# Patient Record
Sex: FEMALE | Race: WHITE | Employment: FULL TIME | URBAN - METROPOLITAN AREA
[De-identification: names, ages, dates, MRNs, and addresses within clinical notes are randomized per-mention and may not be internally consistent; named-entity substitution may affect disease eponyms.]

---

## 2017-07-11 ENCOUNTER — HOSPITAL ENCOUNTER (EMERGENCY)
Age: 32
Discharge: HOME | End: 2017-07-11
Attending: EMERGENCY MEDICINE | Admitting: EMERGENCY MEDICINE
Payer: COMMERCIAL

## 2017-07-11 ENCOUNTER — HOSPITAL ENCOUNTER (EMERGENCY)
Age: 32
Discharge: HOME | End: 2017-07-11
Attending: STUDENT IN AN ORGANIZED HEALTH CARE EDUCATION/TRAINING PROGRAM
Payer: COMMERCIAL

## 2017-07-11 VITALS
OXYGEN SATURATION: 99 % | TEMPERATURE: 99.2 F | HEART RATE: 82 BPM | RESPIRATION RATE: 14 BRPM | SYSTOLIC BLOOD PRESSURE: 116 MMHG | WEIGHT: 152 LBS | BODY MASS INDEX: 25.95 KG/M2 | DIASTOLIC BLOOD PRESSURE: 77 MMHG | HEIGHT: 64 IN

## 2017-07-11 VITALS
WEIGHT: 149.47 LBS | TEMPERATURE: 98.7 F | DIASTOLIC BLOOD PRESSURE: 77 MMHG | HEIGHT: 64 IN | OXYGEN SATURATION: 99 % | SYSTOLIC BLOOD PRESSURE: 117 MMHG | HEART RATE: 84 BPM | BODY MASS INDEX: 25.52 KG/M2 | RESPIRATION RATE: 18 BRPM

## 2017-07-11 DIAGNOSIS — F41.1 ANXIETY STATE: Primary | ICD-10-CM

## 2017-07-11 PROCEDURE — 99284 EMERGENCY DEPT VISIT MOD MDM: CPT

## 2017-07-11 PROCEDURE — 99283 EMERGENCY DEPT VISIT LOW MDM: CPT

## 2017-07-11 PROCEDURE — 90791 PSYCH DIAGNOSTIC EVALUATION: CPT

## 2017-07-11 PROCEDURE — 74011250637 HC RX REV CODE- 250/637: Performed by: EMERGENCY MEDICINE

## 2017-07-11 RX ORDER — LORAZEPAM 1 MG/1
1 TABLET ORAL
Status: COMPLETED | OUTPATIENT
Start: 2017-07-11 | End: 2017-07-11

## 2017-07-11 RX ORDER — SERTRALINE HYDROCHLORIDE 25 MG/1
25 TABLET, FILM COATED ORAL DAILY
Status: ON HOLD | COMMUNITY
End: 2017-07-12

## 2017-07-11 RX ORDER — HYDROXYZINE PAMOATE 25 MG/1
25 CAPSULE ORAL
Qty: 20 CAP | Refills: 0 | Status: ON HOLD | OUTPATIENT
Start: 2017-07-11 | End: 2017-07-12

## 2017-07-11 RX ADMIN — LORAZEPAM 1 MG: 1 TABLET ORAL at 10:15

## 2017-07-11 NOTE — ED TRIAGE NOTES
Triage note: Pt arrives ambulatory with c/o \"I feel like I'm having a nervous break down. I was seen at [facility] today and they gave me some hydroxyzine and it's not working. I called the hotline and they said to come in.\" When asked if she's having SI pt states \"I don't know, kind of.\" Denies plan.

## 2017-07-11 NOTE — ED PROVIDER NOTES
HPI Comments: Jackee Saint is a 36 y.o. female with PMhx significant for post-partum depression who presents ambulatory to the ED for evaluation of a mental health problem. Pt states that she has been experiencing increased anxiety since yesterday, explaining that she feels as if she's having a nervous breakdown as she can't control her emotions. She reports some SOB and decreased appetite secondary to her anxiety. She has a hx of post-partum depression and states that she previously was prescribed Zoloft following her first pregancy which she used for approximately 2 years prior to the birth of her second child 3 months ago; she denies any hx of depression or anxiety prior to the birth of her first child. She denies any complications with her two pregnancies. She reports that she has been under increased stress at home raising a 3year old infant and 4 month old, noting her  recently lost his job and she works full time from home while raising her kids full time; she receives some assistance from her  and mother-in-law but states she sometimes feels overwhelmed. She reports re-starting the Zoloft 25 mg today as she has leftover prescriptions, noting she was previously followed by Dr. Juan Sunshine (psychiatry). She specifically denies any thoughts of SI/HI, CP, N/V/D, or F/C. Social Hx: - Tobacco, + EtOH, - Illicit Drugs    There are no other complaints, changes or physical findings at this time. The history is provided by the patient. No  was used. Past Medical History:   Diagnosis Date    Depression     GERD (gastroesophageal reflux disease)     Peptic ulcer        No past surgical history on file. No family history on file. Social History     Social History    Marital status:      Spouse name: N/A    Number of children: N/A    Years of education: N/A     Occupational History    Not on file.      Social History Main Topics    Smoking status: Never Smoker    Smokeless tobacco: Not on file    Alcohol use Yes    Drug use: No    Sexual activity: Not on file     Other Topics Concern    Not on file     Social History Narrative    No narrative on file         ALLERGIES: Sulfa (sulfonamide antibiotics)    Review of Systems   Constitutional: Positive for appetite change (decreased). Negative for chills, diaphoresis, fever and unexpected weight change. HENT: Negative for rhinorrhea and sore throat. Eyes: Negative for pain. Respiratory: Positive for shortness of breath (secondary to anxiety). Negative for wheezing and stridor. Cardiovascular: Negative for chest pain and leg swelling. Gastrointestinal: Negative for abdominal pain, blood in stool, diarrhea, nausea and vomiting. Genitourinary: Negative for difficulty urinating, dysuria and flank pain. Musculoskeletal: Negative for back pain and neck stiffness. Skin: Negative for rash. Neurological: Negative for seizures, syncope, weakness, light-headedness and headaches. Psychiatric/Behavioral: Negative for confusion. The patient is nervous/anxious (anxiety). Denies SI/HI       Patient Vitals for the past 12 hrs:   Temp Pulse Resp BP SpO2   07/11/17 1015 - - - 117/77 99 %   07/11/17 1000 - - - 113/76 100 %   07/11/17 0945 - - - 110/67 99 %   07/11/17 0940 - - - 116/74 -   07/11/17 0919 98.7 °F (37.1 °C) 84 18 (!) 122/103 100 %            Physical Exam   Constitutional: She is oriented to person, place, and time. She appears well-developed and well-nourished. No distress. HENT:   Nose: Nose normal.   Mouth/Throat: Oropharynx is clear and moist. No oropharyngeal exudate. Eyes: Conjunctivae and EOM are normal. Pupils are equal, round, and reactive to light. Right eye exhibits no discharge. Left eye exhibits no discharge. No scleral icterus. Neck: Normal range of motion. Neck supple. No JVD present.    Cardiovascular: Normal rate, regular rhythm, normal heart sounds and intact distal pulses. No murmur heard. Pulmonary/Chest: Effort normal and breath sounds normal. No stridor. No respiratory distress. She has no wheezes. She has no rales. Abdominal: Soft. Bowel sounds are normal. She exhibits no distension. There is no tenderness. There is no rebound and no guarding. Musculoskeletal: She exhibits no edema or tenderness. Neurological: She is alert and oriented to person, place, and time. Skin: Skin is warm and dry. No rash noted. She is not diaphoretic. Psychiatric: Her mood appears anxious. Tearful on exam.    Nursing note and vitals reviewed. MDM  Number of Diagnoses or Management Options  Post partum depression:   Diagnosis management comments: Post-partum depression with additional symptoms of anxiety and multiple social stressors. Pt without SI. Counseled pt at length (>20 minutes spent discussing self-care, stress relief, medications to be used prn, need for counseling, etc.) and have advised her to follow up closely with mental health. Additionally, she will restart her Zoloft. Stable for discharge. Amount and/or Complexity of Data Reviewed  Review and summarize past medical records: yes    Patient Progress  Patient progress: stable        Procedures    MEDICATIONS GIVEN:  Medications   LORazepam (ATIVAN) tablet 1 mg (1 mg Oral Given 7/11/17 1015)       IMPRESSION:  1. Post partum depression        PLAN:  1. Discharge Medication List as of 7/11/2017 10:14 AM      CONTINUE these medications which have NOT CHANGED    Details   sertraline (ZOLOFT) 25 mg tablet Take 25 mg by mouth daily. , Historical Med           2. Follow-up Information     Follow up With Details Comments [facility] Provider Call in 1 day          Return to ED if worse     Discharge Note:  10:20 AM  The patient has been re-evaluated and is ready for discharge. Reviewed available results with patient. Counseled patient on diagnosis and care plan.  Patient has expressed understanding, and all questions have been answered. Patient agrees with plan and agrees to follow up as recommended, or return to the ED if their symptoms worsen. Discharge instructions have been provided and explained to the patient, along with reasons to return to the ED. Attestation: This note is prepared by Rubin Randolph, acting as Scribe for Josh Sam MD.    Josh Sam MD: The scribe's documentation has been prepared under my direction and personally reviewed by me in its entirety. I confirm that the note above accurately reflects all work, treatment, procedures, and medical decision making performed by me.

## 2017-07-11 NOTE — BSMART NOTE
Was asked to see this pt  Today due to anxiety. Pt. States she went to [id number] [facility] this am and was given a 1 mg. Ativan and started to feel better but didn't feel like she got much help. Pt.  started calling around to see if she could get in to see a psychiatrist but realized it would take a long time so she came here to see what we could offer. Pt was not offered BSMART at [address] [facility] and reported that she was diagnosed with Post Partum Depression. Pt states she works from home and has 2 young children so she never gets out of her home. Pt. Has a 132 month old and a 3year old.  has just been layed off from her job so finances are a big concern. Pt. Is not reporting any acute symptoms but is tearful and reports feeling overwhelmed with her responsibilities and feeling tired from lack of sleep from her baby. Discussed pt getting a counselor (and referred her to [facility] counseling since she lives in [place]) as well as getting a scheduled appointment to see a psychiatrist (which she used to see one at [facility] but got pregnant so stopped going) for medication to address her anxiety and perhaps some depression. Pt denies suicidal or homicidal ideations. Pt is alert and oriented X3. Pt is not psychotic or delusional. Discussed with the PA perhaps writing pt out of work for a few days to [employer] as well as perhaps a medication for something for her anxiety until she sees someone at [facility] (since pt doesn't have a PCP at this time). Pt will be discharged once medically cleared.   Mehreen calero, BHARTI

## 2017-07-11 NOTE — ED PROVIDER NOTES
HPI Comments: 36 y.o. female with past medical history significant for depression, GERD and peptic ulcer who presents ambulatory from home accompanied by her  with chief complaint of anxiety. Pt states worsening anxiety onset 3 months ago after having a baby. Pt states she is \"unable to control her emotions\". Pt admits to multiple home stressors, specifically her 's unemployment and her . Pt states she has previously taken Zoloft, but was weaned off of her medications during her pregnancy. She took Zoloft today as well as hydroxyzine that she was prescribed at [facility] this morning. Pt states neither medication offered relief. Pt specifically denies chest pain and abdominal pain. There are no other acute medical concerns at this time. Social hx: denies tobacco use; uses EtOH rarely; denies illicit drug use  PCP: None    Note written by Matthieu Holman, as dictated by Paula Johnston PA-C 5:23 PM      The history is provided by the patient. Past Medical History:   Diagnosis Date    Depression     GERD (gastroesophageal reflux disease)     Peptic ulcer        Past Surgical History:   Procedure Laterality Date    HX  SECTION  ,          No family history on file. Social History     Social History    Marital status:      Spouse name: N/A    Number of children: N/A    Years of education: N/A     Occupational History    Not on file. Social History Main Topics    Smoking status: Never Smoker    Smokeless tobacco: Not on file    Alcohol use Yes      Comment: rarely    Drug use: No    Sexual activity: Not on file     Other Topics Concern    Not on file     Social History Narrative         ALLERGIES: Sulfa (sulfonamide antibiotics)    Review of Systems   Constitutional: Negative for chills, diaphoresis and fever. HENT: Negative for congestion, postnasal drip, rhinorrhea and sore throat.     Eyes: Negative for photophobia, discharge, redness and visual disturbance. Respiratory: Negative for cough, chest tightness, shortness of breath and wheezing. Cardiovascular: Negative for chest pain, palpitations and leg swelling. Gastrointestinal: Negative for abdominal distention, abdominal pain, blood in stool, constipation, diarrhea, nausea and vomiting. Genitourinary: Negative for difficulty urinating, dysuria, frequency, hematuria and urgency. Musculoskeletal: Negative for arthralgias, back pain, joint swelling and myalgias. Skin: Negative for color change and rash. Neurological: Negative for dizziness, speech difficulty, weakness, light-headedness, numbness and headaches. Psychiatric/Behavioral: Negative for confusion. The patient is nervous/anxious. All other systems reviewed and are negative. Vitals:    07/11/17 1504   BP: 108/76   Pulse: 76   Resp: 18   Temp: 98.4 °F (36.9 °C)   SpO2: 97%   Weight: 68.9 kg (152 lb)   Height: 5' 4\" (1.626 m)            Physical Exam   Constitutional: She is oriented to person, place, and time. She appears well-developed and well-nourished. HENT:   Head: Normocephalic and atraumatic. Eyes: Conjunctivae are normal. Pupils are equal, round, and reactive to light. Right eye exhibits no discharge. Left eye exhibits no discharge. Neck: Normal range of motion. Neck supple. No thyromegaly present. Cardiovascular: Normal rate, regular rhythm and normal heart sounds. Exam reveals no gallop and no friction rub. No murmur heard. Pulmonary/Chest: Effort normal and breath sounds normal. No respiratory distress. She has no wheezes. Abdominal: Soft. Bowel sounds are normal. She exhibits no distension. There is no tenderness. There is no rebound and no guarding. Musculoskeletal: Normal range of motion. Neurological: She is alert and oriented to person, place, and time. Skin: Skin is warm. Psychiatric: She has a normal mood and affect.         MDM  Number of Diagnoses or Management Options  Anxiety state:   Diagnosis management comments: Pt presents with anxiety. B-SMART came and evaluated and determined that she does not meet criteria for admission. Will d/c home with resources provided by [facility]. Reviewed treatment plan with attending and they agree. Leticia Padilla      ED Course       Procedures    4:41 PM  BSMART at bedside evaluating patient.

## 2017-07-11 NOTE — ED TRIAGE NOTES
Pt received to exam room for c/o feeling like she is \"on the verge of a nervous breakdown. Pt had baby 3 mos ago and has been feeling like this since but feels like things are getting worse. Pt was taking Zoloft before pregnancy but stopped taking during pregnancy. States took a Zoloft this AM along w/ Xanax.  is at bedside.

## 2017-07-11 NOTE — DISCHARGE INSTRUCTIONS
Anxiety Disorder: Care Instructions  Your Care Instructions  Anxiety is a normal reaction to stress. Difficult situations can cause you to have symptoms such as sweaty palms and a nervous feeling. In an anxiety disorder, the symptoms are far more severe. Constant worry, muscle tension, trouble sleeping, nausea and diarrhea, and other symptoms can make normal daily activities difficult or impossible. These symptoms may occur for no reason, and they can affect your work, school, or social life. Medicines, counseling, and self-care can all help. Follow-up care is a key part of your treatment and safety. Be sure to make and go to all appointments, and call your doctor if you are having problems. It's also a good idea to know your test results and keep a list of the medicines you take. How can you care for yourself at home? · Take medicines exactly as directed. Call your doctor if you think you are having a problem with your medicine. · Go to your counseling sessions and follow-up appointments. · Recognize and accept your anxiety. Then, when you are in a situation that makes you anxious, say to yourself, \"This is not an emergency. I feel uncomfortable, but I am not in danger. I can keep going even if I feel anxious. \"  · Be kind to your body:  ¨ Relieve tension with exercise or a massage. ¨ Get enough rest.  ¨ Avoid alcohol, caffeine, nicotine, and illegal drugs. They can increase your anxiety level and cause sleep problems. ¨ Learn and do relaxation techniques. See below for more about these techniques. · Engage your mind. Get out and do something you enjoy. Go to a funny movie, or take a walk or hike. Plan your day. Having too much or too little to do can make you anxious. · Keep a record of your symptoms. Discuss your fears with a good friend or family member, or join a support group for people with similar problems. Talking to others sometimes relieves stress.   · Get involved in social groups, or volunteer to help others. Being alone sometimes makes things seem worse than they are. · Get at least 30 minutes of exercise on most days of the week to relieve stress. Walking is a good choice. You also may want to do other activities, such as running, swimming, cycling, or playing tennis or team sports. Relaxation techniques  Do relaxation exercises 10 to 20 minutes a day. You can play soothing, relaxing music while you do them, if you wish. · Tell others in your house that you are going to do your relaxation exercises. Ask them not to disturb you. · Find a comfortable place, away from all distractions and noise. · Lie down on your back, or sit with your back straight. · Focus on your breathing. Make it slow and steady. · Breathe in through your nose. Breathe out through either your nose or mouth. · Breathe deeply, filling up the area between your navel and your rib cage. Breathe so that your belly goes up and down. · Do not hold your breath. · Breathe like this for 5 to 10 minutes. Notice the feeling of calmness throughout your whole body. As you continue to breathe slowly and deeply, relax by doing the following for another 5 to 10 minutes:  · Tighten and relax each muscle group in your body. You can begin at your toes and work your way up to your head. · Imagine your muscle groups relaxing and becoming heavy. · Empty your mind of all thoughts. · Let yourself relax more and more deeply. · Become aware of the state of calmness that surrounds you. · When your relaxation time is over, you can bring yourself back to alertness by moving your fingers and toes and then your hands and feet and then stretching and moving your entire body. Sometimes people fall asleep during relaxation, but they usually wake up shortly afterward. · Always give yourself time to return to full alertness before you drive a car or do anything that might cause an accident if you are not fully alert.  Never play a relaxation tape while you drive a car. When should you call for help? Call 911 anytime you think you may need emergency care. For example, call if:  · You feel you cannot stop from hurting yourself or someone else. Keep the numbers for these national suicide hotlines: 1-979-741-TALK (2-794.552.5695) and 0-980-PULGSFF (3-775.642.4750). If you or someone you know talks about suicide or feeling hopeless, get help right away. Watch closely for changes in your health, and be sure to contact your doctor if:  · You have anxiety or fear that affects your life. · You have symptoms of anxiety that are new or different from those you had before. Where can you learn more? Go to http://gretchenTributes.comamor.info/. Enter P754 in the search box to learn more about \"Anxiety Disorder: Care Instructions. \"  Current as of: July 26, 2016  Content Version: 11.3  © 5946-3678 [employer]. Care instructions adapted under license by [employer] (which disclaims liability or warranty for this information). If you have questions about a medical condition or this instruction, always ask your healthcare professional. [address] any warranty or liability for your use of this information. We hope that we have addressed all of your medical concerns. The examination and treatment you received in the Emergency Department were for an emergent problem and were not intended as complete care. It is important that you follow up with your healthcare provider(s) for ongoing care. If your symptoms worsen or do not improve as expected, and you are unable to reach your usual health care provider(s), you should return to the Emergency Department. Today's healthcare is undergoing tremendous change, and patient satisfaction surveys are one of the many tools to assess the quality of medical care.   You may receive a survey from the [employer] regarding your experience in the Emergency Department. I hope that your experience has been completely positive, particularly the medical care that I provided. As such, please participate in the survey; anything less than excellent does not meet my expectations or intentions. [facility] and [facility] participate in nationally recognized quality of care measures. If your blood pressure is greater than 120/80, as reported below, we urge that you seek medical care to address the potential of high blood pressure, commonly known as hypertension. Hypertension can be hereditary or can be caused by certain medical conditions, pain, stress, or \"white coat syndrome. \"       Please make an appointment with your health care provider(s) for follow up of your Emergency Department visit. VITALS:   Patient Vitals for the past 8 hrs:   Temp Pulse Resp BP SpO2   07/11/17 1504 98.4 °F (36.9 °C) 76 18 108/76 97 %          Thank you for allowing us to provide you with medical care today. We realize that you have many choices for your emergency care needs. Please choose us in the future for any continued health care needs. Angélica Doan milad Caballero, [address]: [phone]            No results found for this or any previous visit (from the past 24 hour(s)). No results found.

## 2017-07-11 NOTE — LETTER
[address] 
[address] [facility] [id number] 
[phone] Work/School Note Date: 7/11/2017 To Whom It May concern: 
 
Azul Thomas was seen and treated today in the emergency room by the following provider(s): 
Attending Provider: Miguel Cole MD 
Physician Assistant: Rudy Nye PA-C. Azul Thomas may return to work on 7/13/17.  
 
Sincerely, 
 
 
 
 
Rudy Nye PA-C

## 2017-07-11 NOTE — DISCHARGE INSTRUCTIONS
Depression After Childbirth: Care Instructions  Your Care Instructions  Many women get the \"baby blues\" during the first few days after childbirth. You may lose sleep, feel irritable, and cry easily. You may feel happy one minute and sad the next. Hormone changes are one cause of these emotional changes. Also, the demands of a new baby, along with visits from relatives or other family needs, add to a mother's stress. The \"baby blues\" often peak around the fourth day. Then they ease up in less than 2 weeks. If your moodiness or anxiety lasts for more than 2 weeks, or if you feel like life is not worth living, you may have postpartum depression. This is different for each mother. Some mothers with serious depression may worry intensely about their infant's well-being. Others may feel distant from their child. Some mothers might even feel that they might harm their baby. A mother may have signs of paranoia, wondering if someone is watching her. Depression is not a sign of weakness. It is a medical condition that requires treatment. Medicine and counseling often work well to reduce depression. Talk to your doctor about taking antidepressant medicine while breastfeeding. Follow-up care is a key part of your treatment and safety. Be sure to make and go to all appointments, and call your doctor if you are having problems. It's also a good idea to know your test results and keep a list of the medicines you take. How do you know if you are depressed? With all the changes in your life, you may not know if you are depressed. Pregnancy sometimes causes changes in how you feel that are similar to the symptoms of depression. Symptoms of depression include:  · Feeling sad or hopeless and losing interest in daily activities. These are the most common symptoms of depression. · Sleeping too much or not enough. · Feeling tired. You may feel as if you have no energy. · Eating too much or too little.   · Writing or talking about death, such as writing suicide notes or talking about guns, knives, or pills. Keep the numbers for these national suicide hotlines: 3-851-459-TALK (9-963.839.3681) and 0-157-MOEGAWJ (0-259.914.9543). If you or someone you know talks about suicide or feeling hopeless, get help right away. How can you care for yourself at home? · Be safe with medicines. Take your medicines exactly as prescribed. Call your doctor if you think you are having a problem with your medicine. · Eat a healthy diet so that you can keep up your energy. · Get regular daily exercise, such as walks, to help improve your mood. · Get as much sunlight as possible. Keep your shades and curtains open. Get outside as much as you can. · Avoid using alcohol or other substances to feel better. · Get as much rest and sleep as possible. Avoid doing too much. Being too tired can increase depression. · Play stimulating music throughout your day and soothing music at night. · Schedule outings and visits with friends and family. Ask them to call you regularly, so that you do not feel alone. · Ask for help with preparing food and other daily tasks. Family and friends are often happy to help a mother with a . · Be honest with yourself and those who care about you. Tell them about your struggle. · Join a support group of new mothers. No one can better understand the challenges of caring for a  than other new mothers. · If you feel like life is not worth living or are feeling hopeless, get help right away. Keep the numbers for these national suicide hotlines: -TALK (8-460.514.7063) and 1-082-QMVQFEH (0-841.817.6531). When should you call for help? Call 911 anytime you think you may need emergency care. For example, call if:  · You feel you cannot stop from hurting yourself, your baby, or someone else. Call your doctor now or seek immediate medical care if:  · You are having trouble caring for yourself or your baby.   · You hear voices. Watch closely for changes in your health, and be sure to contact your doctor if:  · You have problems with your depression medicine. · You do not get better as expected. Where can you learn more? Go to http://gretchen-amor.info/. Enter Z727 in the search box to learn more about \"Depression After Childbirth: Care Instructions. \"  Current as of: July 26, 2016  Content Version: 11.3  © 0241-5091 [employer]. Care instructions adapted under license by [employer] (which disclaims liability or warranty for this information). If you have questions about a medical condition or this instruction, always ask your healthcare professional. [address] any warranty or liability for your use of this information.

## 2017-07-11 NOTE — ED NOTES
Discharge instructions reviewed w/ pt and copy given by Dr. Chhaya Salinas. Pt discharged ambulatory to care of .

## 2017-07-12 ENCOUNTER — HOSPITAL ENCOUNTER (INPATIENT)
Age: 32
LOS: 2 days | Discharge: HOME | DRG: 884 | End: 2017-07-14
Attending: PSYCHIATRY & NEUROLOGY | Admitting: PSYCHIATRY & NEUROLOGY
Payer: COMMERCIAL

## 2017-07-12 LAB
ALBUMIN SERPL BCP-MCNC: 4.4 G/DL (ref 3.5–5)
ALBUMIN/GLOB SERPL: 1.2 {RATIO} (ref 1.1–2.2)
ALP SERPL-CCNC: 70 U/L (ref 45–117)
ALT SERPL-CCNC: 25 U/L (ref 12–78)
AMPHET UR QL SCN: NEGATIVE
ANION GAP BLD CALC-SCNC: 8 MMOL/L (ref 5–15)
APPEARANCE UR: ABNORMAL
AST SERPL W P-5'-P-CCNC: 15 U/L (ref 15–37)
BACTERIA URNS QL MICRO: NEGATIVE /HPF
BARBITURATES UR QL SCN: NEGATIVE
BASOPHILS # BLD AUTO: 0 K/UL (ref 0–0.1)
BASOPHILS # BLD: 0 % (ref 0–1)
BENZODIAZ UR QL: POSITIVE
BILIRUB SERPL-MCNC: 2.9 MG/DL (ref 0.2–1)
BILIRUB UR QL CFM: NEGATIVE
BUN SERPL-MCNC: 20 MG/DL (ref 6–20)
BUN/CREAT SERPL: 22 (ref 12–20)
CALCIUM SERPL-MCNC: 8.9 MG/DL (ref 8.5–10.1)
CANNABINOIDS UR QL SCN: NEGATIVE
CHLORIDE SERPL-SCNC: 105 MMOL/L (ref 97–108)
CO2 SERPL-SCNC: 25 MMOL/L (ref 21–32)
COCAINE UR QL SCN: NEGATIVE
COLOR UR: ABNORMAL
CREAT SERPL-MCNC: 0.92 MG/DL (ref 0.55–1.02)
DRUG SCRN COMMENT,DRGCM: ABNORMAL
EOSINOPHIL # BLD: 0.1 K/UL (ref 0–0.4)
EOSINOPHIL NFR BLD: 1 % (ref 0–7)
EPITH CASTS URNS QL MICRO: ABNORMAL /LPF
ERYTHROCYTE [DISTWIDTH] IN BLOOD BY AUTOMATED COUNT: 14.4 % (ref 11.5–14.5)
ETHANOL SERPL-MCNC: <10 MG/DL
GLOBULIN SER CALC-MCNC: 3.6 G/DL (ref 2–4)
GLUCOSE SERPL-MCNC: 97 MG/DL (ref 65–100)
GLUCOSE UR STRIP.AUTO-MCNC: NEGATIVE MG/DL
HCG UR QL: NEGATIVE
HCT VFR BLD AUTO: 38.5 % (ref 35–47)
HGB BLD-MCNC: 13.1 G/DL (ref 11.5–16)
HGB UR QL STRIP: ABNORMAL
KETONES UR QL STRIP.AUTO: ABNORMAL MG/DL
LEUKOCYTE ESTERASE UR QL STRIP.AUTO: NEGATIVE
LYMPHOCYTES # BLD AUTO: 27 % (ref 12–49)
LYMPHOCYTES # BLD: 1.4 K/UL (ref 0.8–3.5)
MCH RBC QN AUTO: 27.4 PG (ref 26–34)
MCHC RBC AUTO-ENTMCNC: 34 G/DL (ref 30–36.5)
MCV RBC AUTO: 80.5 FL (ref 80–99)
METHADONE UR QL: NEGATIVE
MONOCYTES # BLD: 0.4 K/UL (ref 0–1)
MONOCYTES NFR BLD AUTO: 8 % (ref 5–13)
MUCOUS THREADS URNS QL MICRO: ABNORMAL /LPF
NEUTS SEG # BLD: 3.2 K/UL (ref 1.8–8)
NEUTS SEG NFR BLD AUTO: 64 % (ref 32–75)
NITRITE UR QL STRIP.AUTO: NEGATIVE
OPIATES UR QL: NEGATIVE
PCP UR QL: NEGATIVE
PH UR STRIP: 6 [PH] (ref 5–8)
PLATELET # BLD AUTO: 206 K/UL (ref 150–400)
POTASSIUM SERPL-SCNC: 3.8 MMOL/L (ref 3.5–5.1)
PROT SERPL-MCNC: 8 G/DL (ref 6.4–8.2)
PROT UR STRIP-MCNC: ABNORMAL MG/DL
RBC # BLD AUTO: 4.78 M/UL (ref 3.8–5.2)
RBC #/AREA URNS HPF: ABNORMAL /HPF (ref 0–5)
SODIUM SERPL-SCNC: 138 MMOL/L (ref 136–145)
SP GR UR REFRACTOMETRY: 1.03 (ref 1–1.03)
UROBILINOGEN UR QL STRIP.AUTO: 1 EU/DL (ref 0.2–1)
WBC # BLD AUTO: 5.1 K/UL (ref 3.6–11)
WBC URNS QL MICRO: ABNORMAL /HPF (ref 0–4)

## 2017-07-12 PROCEDURE — 90791 PSYCH DIAGNOSTIC EVALUATION: CPT

## 2017-07-12 PROCEDURE — 80307 DRUG TEST PRSMV CHEM ANLYZR: CPT | Performed by: STUDENT IN AN ORGANIZED HEALTH CARE EDUCATION/TRAINING PROGRAM

## 2017-07-12 PROCEDURE — 36415 COLL VENOUS BLD VENIPUNCTURE: CPT | Performed by: PSYCHIATRY & NEUROLOGY

## 2017-07-12 PROCEDURE — 99284 EMERGENCY DEPT VISIT MOD MDM: CPT

## 2017-07-12 PROCEDURE — 74011250637 HC RX REV CODE- 250/637: Performed by: PSYCHIATRY & NEUROLOGY

## 2017-07-12 PROCEDURE — 65220000003 HC RM SEMIPRIVATE PSYCH

## 2017-07-12 PROCEDURE — 81025 URINE PREGNANCY TEST: CPT

## 2017-07-12 PROCEDURE — 81001 URINALYSIS AUTO W/SCOPE: CPT | Performed by: STUDENT IN AN ORGANIZED HEALTH CARE EDUCATION/TRAINING PROGRAM

## 2017-07-12 PROCEDURE — 80053 COMPREHEN METABOLIC PANEL: CPT | Performed by: STUDENT IN AN ORGANIZED HEALTH CARE EDUCATION/TRAINING PROGRAM

## 2017-07-12 PROCEDURE — 36415 COLL VENOUS BLD VENIPUNCTURE: CPT | Performed by: STUDENT IN AN ORGANIZED HEALTH CARE EDUCATION/TRAINING PROGRAM

## 2017-07-12 PROCEDURE — 84443 ASSAY THYROID STIM HORMONE: CPT | Performed by: PSYCHIATRY & NEUROLOGY

## 2017-07-12 PROCEDURE — 85025 COMPLETE CBC W/AUTO DIFF WBC: CPT | Performed by: STUDENT IN AN ORGANIZED HEALTH CARE EDUCATION/TRAINING PROGRAM

## 2017-07-12 RX ORDER — ACETAMINOPHEN 325 MG/1
650 TABLET ORAL
Status: DISCONTINUED | OUTPATIENT
Start: 2017-07-12 | End: 2017-07-14 | Stop reason: HOSPADM

## 2017-07-12 RX ORDER — LORAZEPAM 2 MG/ML
2 INJECTION INTRAMUSCULAR
Status: DISCONTINUED | OUTPATIENT
Start: 2017-07-12 | End: 2017-07-14 | Stop reason: HOSPADM

## 2017-07-12 RX ORDER — SERTRALINE HYDROCHLORIDE 50 MG/1
50 TABLET, FILM COATED ORAL DAILY
Status: DISCONTINUED | OUTPATIENT
Start: 2017-07-13 | End: 2017-07-13

## 2017-07-12 RX ORDER — ZOLPIDEM TARTRATE 10 MG/1
10 TABLET ORAL
Status: DISCONTINUED | OUTPATIENT
Start: 2017-07-12 | End: 2017-07-12 | Stop reason: DRUGHIGH

## 2017-07-12 RX ORDER — LORAZEPAM 1 MG/1
1 TABLET ORAL
Status: DISCONTINUED | OUTPATIENT
Start: 2017-07-12 | End: 2017-07-14 | Stop reason: HOSPADM

## 2017-07-12 RX ORDER — IBUPROFEN 200 MG
1 TABLET ORAL
Status: DISCONTINUED | OUTPATIENT
Start: 2017-07-12 | End: 2017-07-14 | Stop reason: HOSPADM

## 2017-07-12 RX ORDER — ADHESIVE BANDAGE
30 BANDAGE TOPICAL DAILY PRN
Status: DISCONTINUED | OUTPATIENT
Start: 2017-07-12 | End: 2017-07-14 | Stop reason: HOSPADM

## 2017-07-12 RX ORDER — BENZTROPINE MESYLATE 2 MG/1
2 TABLET ORAL
Status: DISCONTINUED | OUTPATIENT
Start: 2017-07-12 | End: 2017-07-14 | Stop reason: HOSPADM

## 2017-07-12 RX ORDER — SERTRALINE HYDROCHLORIDE 50 MG/1
50 TABLET, FILM COATED ORAL DAILY
COMMUNITY
End: 2017-07-14

## 2017-07-12 RX ORDER — ZOLPIDEM TARTRATE 5 MG/1
5 TABLET ORAL
Status: DISCONTINUED | OUTPATIENT
Start: 2017-07-12 | End: 2017-07-14 | Stop reason: HOSPADM

## 2017-07-12 RX ORDER — BENZTROPINE MESYLATE 1 MG/ML
2 INJECTION INTRAMUSCULAR; INTRAVENOUS
Status: DISCONTINUED | OUTPATIENT
Start: 2017-07-12 | End: 2017-07-14 | Stop reason: HOSPADM

## 2017-07-12 RX ORDER — IBUPROFEN 400 MG/1
400 TABLET ORAL
Status: DISCONTINUED | OUTPATIENT
Start: 2017-07-12 | End: 2017-07-14 | Stop reason: HOSPADM

## 2017-07-12 RX ORDER — OLANZAPINE 5 MG/1
5 TABLET ORAL
Status: DISCONTINUED | OUTPATIENT
Start: 2017-07-12 | End: 2017-07-14 | Stop reason: HOSPADM

## 2017-07-12 RX ADMIN — ZOLPIDEM TARTRATE 5 MG: 5 TABLET ORAL at 23:16

## 2017-07-12 RX ADMIN — LORAZEPAM 1 MG: 1 TABLET ORAL at 21:44

## 2017-07-12 NOTE — ED TRIAGE NOTES
Pt arrives with  after OBGYN visit for SI/HI. Pt states that she has been having thoughts of harming herself and her 4 month old daughter. Pt crying in triage. Daughter currently staying with mother-in-law.

## 2017-07-12 NOTE — IP AVS SNAPSHOT
[address] 
[phone] Patient: Lennie Wilder MRN: [id number] DDJ:[id number]/[id number] Current Discharge Medication List  
  
START taking these medications Dose & Instructions Dispensing Information Comments Morning Noon Evening Bedtime FLUoxetine 10 mg capsule Commonly known as:  PROzac Your last dose was: Your next dose is:    
   
   
 Dose:  10 mg Take 1 Cap by mouth daily. Indications: GENERALIZED ANXIETY DISORDER, major depressive disorder Quantity:  15 Cap Refills:  1  
     
   
   
   
  
 traZODone 50 mg tablet Commonly known as:  Carli Hand Your last dose was: Your next dose is:    
   
   
 Dose:  50 mg Take 1 Tab by mouth nightly. Indications: insomnia associated with depression Quantity:  15 Tab Refills:  1 STOP taking these medications ZOLOFT 50 mg tablet Generic drug:  sertraline Where to Get Your Medications Information on where to get these meds will be given to you by the nurse or doctor. ! Ask your nurse or doctor about these medications FLUoxetine 10 mg capsule  
 traZODone 50 mg tablet

## 2017-07-12 NOTE — IP AVS SNAPSHOT
[address] [facility] [address] [place] [address] 
[phone] Patient: Sandra Pepe MRN: [id number] MMU:1/40/7852 You are allergic to the following Allergen Reactions Sulfa (Sulfonamide Antibiotics) Unknown (comments) Recent Documentation Height Weight BMI OB Status Smoking Status 1.626 m 68.9 kg 26.09 kg/m2 IUD Never Smoker Emergency Contacts Name Discharge Info Relation Home Work Mobile [address] CAREGIVER [3] Spouse [3]   [phone] About your hospitalization You were admitted on:  July 12, 2017 You last received care in the:  [address] [place] [address] You were discharged on:  July 14, 2017 Unit phone number:  [phone] Why you were hospitalized Your primary diagnosis was:  Depression, Postpartum Providers Seen During Your Hospitalizations Provider Role Specialty Primary office phone Marissa Light MD Attending Provider Psychiatry [phone] Ifeoma Carson MD Attending Provider Emergency Medicine [phone] Blake Bob MD Attending Provider Psychiatry [phone] Your Primary Care Physician (PCP) Primary Care Physician Office Phone Office Fax NONE ** None ** ** None ** Follow-up Information Follow up With Details Comments Contact Info None   None ([phone]) Patient stated that they have no PCP Theresa Galindo LCSW On 7/19/2017 You have a 3:15pm appointment for therapy. Please remember to bring your photo ID and insurance card. [address] [facility] [address] 
[phone] Jose Willams MD Schedule an appointment as soon as possible for a visit  [address] [facility] [address] [employer] [facility] [address] [place] [address] 
[phone] Current Discharge Medication List  
  
START taking these medications Dose & Instructions Dispensing Information Comments Morning Noon Evening Bedtime FLUoxetine 10 mg capsule Commonly known as:  PROzac Your last dose was: Your next dose is:    
   
   
 Dose:  10 mg Take 1 Cap by mouth daily. Indications: GENERALIZED ANXIETY DISORDER, major depressive disorder Quantity:  15 Cap Refills:  1  
     
   
   
   
  
 traZODone 50 mg tablet Commonly known as:  Illene Dus Your last dose was: Your next dose is:    
   
   
 Dose:  50 mg Take 1 Tab by mouth nightly. Indications: insomnia associated with depression Quantity:  15 Tab Refills:  1 STOP taking these medications ZOLOFT 50 mg tablet Generic drug:  sertraline Where to Get Your Medications Information on where to get these meds will be given to you by the nurse or doctor. ! Ask your nurse or doctor about these medications FLUoxetine 10 mg capsule  
 traZODone 50 mg tablet Discharge Instructions DISCHARGE SUMMARY 
 
Sukumar Borjas : 1985 MRN: [id number] The patient Samia Mcarthur exhibits the ability to control behavior in a less restrictive environment. Patient's level of functioning is improving. No assaultive/destructive behavior has been observed for the past 24 hours. No suicidal/homicidal threat or behavior has been observed for the past 24 hours. There is no evidence of serious medication side effects. Patient has not been in physical or protective restraints for at least the past 24 hours. If weapons involved, how are they secured? No weapons involved. Is patient aware of and in agreement with discharge plan? Yes Arrangements for medication:  Prescriptions given to patient. Copy of discharge instructions to  provider?:  Karma Doll ([phone]) and Dr. Ted Lozoya ([phone]) Arrangements for transportation home:   to  Keep all follow up appointments as scheduled, continue to take prescribed medications per physician instructions. Mental health crisis number:  271 or your local mental health crisis line number at [phone] DISCHARGE SUMMARY from Nurse The following personal items are in your possession at time of discharge: 
 
Dental Appliances: None Visual Aid: None Home Medications: None Jewelry: None Clothing: Pants, Shirt, Undergarments, With patient (2pants,2shirts,2undies) Other Valuables: None Personal Items Sent to Safe: none PATIENT INSTRUCTIONS: 
 
 
 
What to do at Home: 
Recommended activity: Activity as tolerated, If you experience any of the following symptoms thoughts of harming self, feeling overwhelmed with anxiety, sadness or hopelessness, please follow up with your local crisis number. Once you have an established relationship with your assigned providers incorporate them into your support system and crisis plan. . 
 
 
*  Please give a list of your current medications to your Primary Care Provider. *  Please update this list whenever your medications are discontinued, doses are 
    changed, or new medications (including over-the-counter products) are added. *  Please carry medication information at all times in case of emergency situations. These are general instructions for a healthy lifestyle: No smoking/ No tobacco products/ Avoid exposure to second hand smoke Surgeon General's Warning:  Quitting smoking now greatly reduces serious risk to your health. Obesity, smoking, and sedentary lifestyle greatly increases your risk for illness A healthy diet, regular physical exercise & weight monitoring are important for maintaining a healthy lifestyle You may be retaining fluid if you have a history of heart failure or if you experience any of the following symptoms:  Weight gain of 3 pounds or more overnight or 5 pounds in a week, increased swelling in our hands or feet or shortness of breath while lying flat in bed. Please call your doctor as soon as you notice any of these symptoms; do not wait until your next office visit. Recognize signs and symptoms of STROKE: 
 
F-face looks uneven A-arms unable to move or move unevenly S-speech slurred or non-existent T-time-call 911 as soon as signs and symptoms begin-DO NOT go Back to bed or wait to see if you get better-TIME IS BRAIN. Warning Signs of HEART ATTACK Call 911 if you have these symptoms: 
? Chest discomfort. Most heart attacks involve discomfort in the center of the chest that lasts more than a few minutes, or that goes away and comes back. It can feel like uncomfortable pressure, squeezing, fullness, or pain. ? Discomfort in other areas of the upper body. Symptoms can include pain or discomfort in one or both arms, the back, neck, jaw, or stomach. ? Shortness of breath with or without chest discomfort. ? Other signs may include breaking out in a cold sweat, nausea, or lightheadedness. Don't wait more than five minutes to call [address]! Fast action can save your life. Calling 911 is almost always the fastest way to get lifesaving treatment. Emergency Medical Services staff can begin treatment when they arrive  up to an hour sooner than if someone gets to the hospital by car. The discharge information has been reviewed with the patient. The patient verbalized understanding. Discharge medications reviewed with the patient and appropriate educational materials and side effects teaching were provided. Discharge Orders None Introducing [facility]! [facility] introduces [employer] patient portal. Now you can access parts of your medical record, email your doctor's office, and request medication refills online.    
 
1. In your internet browser, go to [email or web address] 2. Click on the First Time User? Click Here link in the Sign In box. You will see the New Member Sign Up page. 3. Enter your [employer] Access Code exactly as it appears below. You will not need to use this code after youve completed the sign-up process. If you do not sign up before the expiration date, you must request a new code. · [employer] Access Code: [email or web address] Expires: 10/9/2017  9:26 AM 
 
4. Enter the last four digits of your Social Security Number (xxxx) and Date of Birth (mm/dd/yyyy) as indicated and click Submit. You will be taken to the next sign-up page. 5. Create a [employer] ID. This will be your [employer] login ID and cannot be changed, so think of one that is secure and easy to remember. 6. Create a [employer] password. You can change your password at any time. 7. Enter your Password Reset Question and Answer. This can be used at a later time if you forget your password. 8. Enter your e-mail address. You will receive e-mail notification when new information is available in [address] 9. Click Sign Up. You can now view and download portions of your medical record. 10. Click the Download Summary menu link to download a portable copy of your medical information. If you have questions, please visit the Frequently Asked Questions section of the [employer] website. Remember, [employer] is NOT to be used for urgent needs. For medical emergencies, dial 911. Now available from your iPhone and Android! General Information Please provide this summary of care documentation to your next provider. Patient Signature:  ____________________________________________________________ Date:  ____________________________________________________________  
  
Larri Hazard Provider Signature:  ____________________________________________________________ Date:  ____________________________________________________________

## 2017-07-12 NOTE — BSMART NOTE
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 Garry Torrez:  Swati---TC to [facility]. A Swati  will call back for additional clinical for authorization. When discharged, patient should have a follow up plan to be seen by [facility] professional within 7 days of discharge. Patient is a 35 yo  female who comes to the ED for a second time in 24 hours complaining of depression, with suicidal thoughts and a plan to harm her 1[de-identified] old baby. Patient reports that she has no significant psychiatric history other than about 2 years ago when her first daughter was born and she went through a post-partum depression but was not psychiatrically hospitalized at that time. She had been seeing a psychiatrist at [place] [address] for anxiety and depression but got pregnant and stopped. Yesterday patient went to [facility] and was given Ativan 1mg but felt she needed more help and came to [facility] where she was seen by Carolina Lentz, [facility] counselor. (See previous BSMART note.) Patient was neither suicidal nor homicidal at the time. She restarted Zoloft 25 mg and Xanax yesterday as she lad leftover pills before she became pregnant. (Patient has stopped nursing.)      Today, patient is alert and oriented. There is no evidence of psychosis. She is tearful in triage. She is feeling overwhelmed, not sleeping and not eating. She denies any SA issues. She is feeling suicidal and homicidal with a plan to suffocate her infant daughter. Patient is  and has one other child, a daughter, age 3. Her  has been unemployed for the past few months which is causing financial stress. Pt works from home as a . Plan:  Patient to be admitted to [facility] [employer] [facility] when medically cleared. [place] Benz.  DENIZ

## 2017-07-12 NOTE — ED PROVIDER NOTES
HPI Comments: 36 y.o. female with past medical history significant for PPD, depression, GERD and peptic ulcer who presents ambulatory from home accompanied by her  with chief complaint of SI. Pt states anxiety and depression was worse this morning. Pt states she had thoughts of hurting herself and her daughter. Pt went to her OBGYN who referred her to the ED. Pt states she has a history of PPD with her 1st daughter. Pt has a 15-month old daughter at home. Pt's  was initially here with her, but had to go home to take care of her daughters. There are no other acute medical concerns at this time. Old Chart Review:  Pt was evaluated in the ED yesterday with the same compliant. She was discharged after [facility] evaluation with mental health resources. Social hx: denies tobacco use; occasional smokeless tobacco user; denies EtOH use; denies illicit drug use; ; employed as a  and works from home  PCP: None    Note written by Matthieu Banuelos, as dictated by Zain Howard MD 6:44 PM        The history is provided by the patient. Past Medical History:   Diagnosis Date    Depression     GERD (gastroesophageal reflux disease)     Peptic ulcer        Past Surgical History:   Procedure Laterality Date    HX  SECTION  ,          History reviewed. No pertinent family history. Social History     Social History    Marital status:      Spouse name: N/A    Number of children: N/A    Years of education: N/A     Occupational History    Not on file.      Social History Main Topics    Smoking status: Never Smoker    Smokeless tobacco: Not on file    Alcohol use Yes      Comment: rarely    Drug use: No    Sexual activity: Not on file     Other Topics Concern    Not on file     Social History Narrative         ALLERGIES: Sulfa (sulfonamide antibiotics)    Review of Systems   Constitutional: Negative for activity change, diaphoresis, fatigue and fever.   HENT: Negative for congestion and sore throat. Eyes: Negative for photophobia and visual disturbance. Respiratory: Negative for chest tightness and shortness of breath. Cardiovascular: Negative for chest pain, palpitations and leg swelling. Gastrointestinal: Negative for abdominal pain, blood in stool, constipation, diarrhea, nausea and vomiting. Genitourinary: Negative for difficulty urinating, dysuria, flank pain, frequency and hematuria. Musculoskeletal: Negative for back pain. Neurological: Negative for dizziness, syncope, numbness and headaches. Psychiatric/Behavioral: Positive for suicidal ideas. The patient is nervous/anxious. All other systems reviewed and are negative. Vitals:    07/12/17 1420   Pulse: (!) 103   Resp: 20   Temp: 98.3 °F (36.8 °C)   SpO2: 98%   Weight: 68.9 kg (152 lb)   Height: 5' 4\" (1.626 m)            Physical Exam   Constitutional: She is oriented to person, place, and time. She appears well-developed and well-nourished. No distress. HENT:   Head: Normocephalic and atraumatic. Right Ear: External ear normal.   Left Ear: External ear normal.   Nose: Nose normal.   Mouth/Throat: Oropharynx is clear and moist.   Eyes: Conjunctivae and EOM are normal. Pupils are equal, round, and reactive to light. No scleral icterus. Neck: Normal range of motion. Neck supple. No JVD present. No tracheal deviation present. No thyromegaly present. Cardiovascular: Normal rate, regular rhythm and normal heart sounds. Exam reveals no gallop and no friction rub. No murmur heard. Pulmonary/Chest: Effort normal and breath sounds normal. No respiratory distress. She has no wheezes. She has no rales. She exhibits no tenderness. Abdominal: Soft. Bowel sounds are normal. She exhibits no distension and no mass. There is no tenderness. There is no rebound and no guarding. Musculoskeletal: Normal range of motion. She exhibits no edema or tenderness.    Lymphadenopathy: She has no cervical adenopathy. Neurological: She is alert and oriented to person, place, and time. She has normal strength. She displays no atrophy and no tremor. No cranial nerve deficit. She exhibits normal muscle tone. Coordination and gait normal.   Skin: Skin is warm and dry. No rash noted. She is not diaphoretic. No erythema. Psychiatric: Judgment normal. Her mood appears anxious. She exhibits a depressed mood. Tearful   Nursing note and vitals reviewed. Note written by Matthieu Holman, as dictated by Elina Mallory MD 6:44 PM     MDM  Number of Diagnoses or Management Options  Depression, postpartum:      Amount and/or Complexity of Data Reviewed  Clinical lab tests: ordered and reviewed  Review and summarize past medical records: yes  Discuss the patient with other providers: yes    Risk of Complications, Morbidity, and/or Mortality  Presenting problems: moderate  Diagnostic procedures: moderate  Management options: moderate    Patient Progress  Patient progress: stable    ED Course       Procedures    3:53 PM  [facility] at [facility] for evaluation. 4:30 PM  BSMART recommending psych admission with medical clearance. Dr. Kassi Sood the admitting physician.     7:00 PM  Discussed available lab and imaging results with patient. Patient verbalizes understanding and agrees with care plan. Will admit patient to psychiatry for further evaluation. 6:27 PM  Patient is being admitted to the hospital.  The results of their tests and reasons for their admission have been discussed with them and/or available family. They convey agreement and understanding for the need to be admitted and for their admission diagnosis. Consultation has been made with the inpatient physician specialist for hospitalization. LABORATORY TESTS:  No results found for this or any previous visit (from the past 12 hour(s)). IMAGING RESULTS:  No orders to display     No results found.     MEDICATIONS GIVEN:  Medications - No data to display    IMPRESSION:  1. Depression, postpartum        PLAN:  1.  Admit to pyshiatry          Debbie Gaxiola MD

## 2017-07-13 LAB — TSH SERPL DL<=0.05 MIU/L-ACNC: 0.46 UIU/ML (ref 0.36–3.74)

## 2017-07-13 PROCEDURE — 74011250637 HC RX REV CODE- 250/637: Performed by: PSYCHIATRY & NEUROLOGY

## 2017-07-13 PROCEDURE — 65220000003 HC RM SEMIPRIVATE PSYCH

## 2017-07-13 RX ORDER — FLUOXETINE 10 MG/1
10 CAPSULE ORAL DAILY
Status: DISCONTINUED | OUTPATIENT
Start: 2017-07-13 | End: 2017-07-14 | Stop reason: HOSPADM

## 2017-07-13 RX ORDER — TRAZODONE HYDROCHLORIDE 50 MG/1
50 TABLET ORAL
Status: DISCONTINUED | OUTPATIENT
Start: 2017-07-13 | End: 2017-07-14 | Stop reason: HOSPADM

## 2017-07-13 RX ADMIN — LORAZEPAM 1 MG: 1 TABLET ORAL at 09:16

## 2017-07-13 RX ADMIN — LORAZEPAM 1 MG: 1 TABLET ORAL at 16:00

## 2017-07-13 RX ADMIN — LORAZEPAM 1 MG: 1 TABLET ORAL at 20:49

## 2017-07-13 RX ADMIN — LORAZEPAM 1 MG: 1 TABLET ORAL at 02:17

## 2017-07-13 RX ADMIN — SERTRALINE HYDROCHLORIDE 50 MG: 50 TABLET ORAL at 08:44

## 2017-07-13 RX ADMIN — FLUOXETINE 10 MG: 10 CAPSULE ORAL at 11:58

## 2017-07-13 RX ADMIN — TRAZODONE HYDROCHLORIDE 50 MG: 50 TABLET ORAL at 21:58

## 2017-07-13 NOTE — BH NOTES
2316 PRN Medication Documentation    Specific patient behavior that led to need for PRN medication: promotion of rest  Staff interventions attempted prior to PRN being given: quiet environment   PRN medication given: Ambien 5 mg po  Patient response/effectiveness of PRN medication: 0015 Pt appears asleep in bed.    0215 PRN Medication Documentation    Specific patient behavior that led to need for PRN medication: tearful, anxious, racing thoughts  Staff interventions attempted prior to PRN being given: cup of ice, therapeutic listening, deep breathing  PRN medication given: Ativan 1 mg po  Patient response/effectiveness of PRN medication: 0300 Pt resting quietly in bed. NAD noted.

## 2017-07-13 NOTE — BH NOTES
Pt given ativan for complaints of anxiety at 1600  At 1645 pt sitting quietly in dining room with peers less anxiety at this time  At [id number] pt retreated to room and remains resting quietly

## 2017-07-13 NOTE — BH NOTES
PRN Medication Documentation    Specific patient behavior that led to need for PRN medication: tearful, anxiety  Staff interventions attempted prior to PRN being given: resting  PRN medication given: ativan  Patient response/effectiveness of PRN medication: @ 2245, effective

## 2017-07-13 NOTE — LACTATION NOTE
Mother requested breast pump today for increasing engorgement during hospitalization. Hospital pump provided. We discussed weaning, pumping, signs of plugged ducts/mastitis, and medications and her milk. While SSRI's are generally considered to be compatible with breastfeeding, benzodiazepines are not safe for infant. (Medications and Mother's Milk- Ericka Cotto 2017 database)  Mother has been dumping her milk during hospitalization and will do until discharge. Mother feels that she may not return to breastfeeding when she goes home, but agrees to discuss medication safety with her baby's pediatrician if she continues.

## 2017-07-13 NOTE — BH NOTES
Difficulty sleeping at start of shift. Requested med and later was noted to be sleeping during rounds. Later during shift came to nurses station crying and stated she was having trouble sleeping, Staff was able to comfort her and encouraged her to try to go back to sleep, Settled shortly after and will continue to monitor throughout shift.

## 2017-07-13 NOTE — PROGRESS NOTES
NUTRITION       Nutrition screening referral was triggered based on results obtained during nursing admission assessment for \"14-23# wt loss and poor appetite. \"     The patient's chart was reviewed and nutrition assessment is not indicated at this time. No wt loss noted for the past 4 months with only slight wt loss over past 5 months. Wt Readings from Last 10 Encounters:   07/12/17 68.9 kg (152 lb)   07/11/17 68.9 kg (152 lb)   07/11/17 67.8 kg (149 lb 7.6 oz)   03/30/16 68.5 kg (151 lb)   02/17/16 71.4 kg (157 lb 6.5 oz)     Please consult if nutrition intervention indicated during remainder of stay. Thank you.      Lupe Emerson RD
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
## 2017-07-13 NOTE — BH NOTES
PRN Medication Documentation    Specific patient behavior that led to need for PRN medication: moderate anxiety, tearful  Staff interventions attempted prior to PRN being given: distraction  PRN medication given: 1 mg Ativan PO  Patient response/effectiveness of PRN medication:

## 2017-07-13 NOTE — BH NOTES
Admission Note: Voluntary. First psych admission. Pt arrived to unit very tearful. C/o increased of depression since  lost job. Patient having HI towards her 4 month old baby. Patient states had same problem 2 years ago after her first daughter's birth, but was not hospitalize. Pt's OBGYN prescribed Zoloft. Patient currently denies SI/HI and agreed to contract for safety. Dr. Ruth Ann Moreno gave [facility] protocol and to start home med Zoloft 50 mg daily.  Verified med from [facility].

## 2017-07-13 NOTE — BH NOTES
GROUP THERAPY PROGRESS NOTE    Naomi Torrez is participating in reflections group. Group time: 15 minutes    Personal goal for participation: PT goal today was to get help.      Goal orientation: personal    Group therapy participation: active    Therapeutic interventions reviewed and discussed: Unit rules and regulations and coloring a picture.        Impression of participation: quiet
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
## 2017-07-13 NOTE — PROGRESS NOTES
Participated in [id number] [facility] spirituality group--theme was releasing the need to be perfect. Ms. Andrae Rios engaged with group in a minimal way after some encouragement.     Jaun Olivares  [facility] Staff  ([facility])   Paging Service [phone])

## 2017-07-13 NOTE — BH NOTES
GROUP THERAPY PROGRESS NOTE    Florestine Saint participated in a Process Group with a focus identifying feelings, planning for the rest of the day, and reviewing DBT skills for managing emotional distress. Group time: 85 minutes. Personal goal for participation: To increase the capacity to improve ones mood, structure, and coping capacity. Goal orientation: The patient will be able to identify their feelings, develop a plan for structuring their day, and begin to appreciate the possibility of successfully managing distress and other forms of intense emotions. Group therapy participation: With prompting, this patient partially participated in the group. Therapeutic interventions reviewed and discussed: The group members were asked to introduce themselves to each other and to see if they could identify an emotion they are having and/or let the group know what they want to focus on for the day as they continue to make discharge plans. The group members also reviewed five suggested areas of DBT options when experiencing intense emotions: distraction, improve the moment, self-soothe, pros and cons, and radical acceptance. They were asked to take turns reading from the handout and discussing its contents. At the end of group the patients were provided a summary of the topics covered. Impression of participation: The patient said she was feeling \"good\" and that she wanted to focus on finding a way to ALL [facility] herself this afternoon. \" She was called out to her treatment team about twenty minutes into the session and did not return for more than five minutes. She was quiet for the rest of the time she was in group. The patient expressed no current SI/HI and no overt psychotic symptoms. Her affect was more anxious than depressed. Her mood was cautious.

## 2017-07-13 NOTE — BSMART NOTE
TRANSFER - IN REPORT:    Verbal report received from Alia Dang RN(name) on Jason  being received from ED(unit) for routine progression of care      Report consisted of patients Situation, Background, Assessment and   Recommendations(SBAR). Information from the following report(s) SBAR and ED Summary was reviewed with the receiving nurse. Opportunity for questions and clarification was provided. Assessment completed upon patients arrival to unit and care assumed.

## 2017-07-13 NOTE — INTERDISCIPLINARY ROUNDS
Behavioral Health Interdisciplinary Rounds     Patient Name: Sandra Pepe  Age: 36 y.o. Room/Bed:  731/  Primary Diagnosis: <principal problem not specified>   Admission Status: Voluntary     Readmission within 30 days: no  Power of  in place: no  Patient requires a blocked bed: no          Reason for blocked bed: n/a    VTE Prophylaxis: Not indicated  Mobility needs/Fall risk: no    Nutritional Plan: no  Consults: no         Labs/Testing due today?: yes (TSH add-on)    Sleep hours: 4.5       Participation in Care/Groups:  yes  Medication Compliant?: Yes  PRNS (last 24 hours): Antianxiety and Sleep Aid    Restraints (last 24 hours):  no  Substance Abuse:  no  CIWA (range last 24 hours):  COWS (range last 24 hours):   Alcohol screening (AUDIT) completed -  AUDIT Score: 0  If applicable, date SBIRT discussed in treatment team AND documented: N/A  Tobacco - patient is a smoker: no   Date tobacco education completed by RN: n/a  24 hour chart check complete: yes     Patient goal(s) for today: attend all groups  Treatment team focus/goals: psychosocial; contact ; start medications  LOS:  1  Expected LOS: TBD  Psychiatric [address] -  No  Name of Decision maker if patient has Psychiatric Care Directive: None   Patient was offered information, patient declined.   Financial concerns/prescription coverage: [employer]  Date of last family contact: None      Family requesting physician contact today: No  Discharge plan: Return home when stable for discharge      Outpatient provider(s): Previously linked to [facility]    Participating treatment team members: Dheerajesau AfshinJohanna MSW; Dr. Sari Browning MD; Merlin Hewitt RN; Senia Vázquez, GalinaD

## 2017-07-13 NOTE — ED TRIAGE NOTES
TRANSFER - OUT REPORT:    Verbal report given to [place] (name) on Jason  being transferred to   (unit) for routine progression of care       Report consisted of patients Situation, Background, Assessment and   Recommendations(SBAR). Information from the following report(s) SBAR, ED Summary, [facility] and Recent Results was reviewed with the receiving nurse. Lines:       Opportunity for questions and clarification was provided.       Patient transported with:   [employer]

## 2017-07-13 NOTE — BH NOTES
Had difficulty sleeping at start of shift. Given medication and settled, Later during shift she came to nurses station and appeared to be upset. Comforted by nurse and was able to settle down and return to room.  Will continue to monitor throughout shift

## 2017-07-13 NOTE — BH NOTES
GROUP THERAPY PROGRESS NOTE    Ayaka Leonard participated in an Afternoon Activity Healthy Expressions Group with a focus on identifying feelings, reading selected poetry, responding, and writing for the day. Group time: 45 minutes     Personal goal for participation: To increase the capacity to improve ones mood and express oneself. Goal orientation: The patient will be able to identify their feelings and talk about the impact of reading three poems (Promargy Knew, On a Rainy Day in [place] and Science and Art) and discussing it with their peers. Group therapy participation: This patient participated in the group, with minimal prompting. Therapeutic interventions reviewed and discussed: The group members were asked to listen to the reading of three poems and to re-read these same poems for five minutes, before sharing what struck them or they could identify with. They were asked to identify an emotion after reading the poems and selecting one to speak about with the group. The group members were also encouraged to continue writing in their journals and/or sharing with each other about their art  what they are absorbed by that takes away a sense of time in a positive fashion. Impression of participation: The patient said she identified with \"margy Truong" because it reminded her of her grandmother's home and the pleasant memories associated with her visits there. She was engaged in the process and shared with her peers. Her affect was depressed but she was alert and appeared fully oriented. She made sure she had her journal available for after thoughts.

## 2017-07-13 NOTE — BH NOTES
Primary Nurse Fina Zavala RN and Gila Winn RN performed a dual skin assessment on this patient No impairment noted  Hector score is 23    Pressure Injury Documentation  (COMPLETE ONE LABEL PER PRESSURE INJURY)  For further information, please review corresponding Wound Care flowsheet. Samia Mcarthur has:    No pressure injury noted and pressure ulcer prevention initiated.       Fina Zavala RN

## 2017-07-14 VITALS
WEIGHT: 152 LBS | RESPIRATION RATE: 18 BRPM | TEMPERATURE: 98.4 F | BODY MASS INDEX: 25.95 KG/M2 | SYSTOLIC BLOOD PRESSURE: 122 MMHG | OXYGEN SATURATION: 98 % | HEIGHT: 64 IN | DIASTOLIC BLOOD PRESSURE: 83 MMHG | HEART RATE: 99 BPM

## 2017-07-14 PROCEDURE — 74011250637 HC RX REV CODE- 250/637: Performed by: PSYCHIATRY & NEUROLOGY

## 2017-07-14 RX ORDER — TRAZODONE HYDROCHLORIDE 50 MG/1
50 TABLET ORAL
Qty: 15 TAB | Refills: 1 | Status: SHIPPED | OUTPATIENT
Start: 2017-07-14 | End: 2018-01-25

## 2017-07-14 RX ORDER — FLUOXETINE 10 MG/1
10 CAPSULE ORAL DAILY
Qty: 15 CAP | Refills: 1 | Status: SHIPPED | OUTPATIENT
Start: 2017-07-14 | End: 2018-01-25

## 2017-07-14 RX ADMIN — LORAZEPAM 1 MG: 1 TABLET ORAL at 11:37

## 2017-07-14 RX ADMIN — FLUOXETINE 10 MG: 10 CAPSULE ORAL at 08:18

## 2017-07-14 NOTE — BH NOTES
Noted to be asleep in bed during initial rounds. Respirations even as chest was noted to be rising and falling. Will continue to monitor all shift.

## 2017-07-14 NOTE — INTERDISCIPLINARY ROUNDS
Behavioral Health Interdisciplinary Rounds     Patient Name: Deven Fiore  Age: 36 y.o. Room/Bed:  731/  Primary Diagnosis: <principal problem not specified>   Admission Status: Voluntary     Readmission within 30 days: no  Power of  in place: no  Patient requires a blocked bed: no          Reason for blocked bed: n/a    VTE Prophylaxis: Not indicated  Mobility needs/Fall risk: no    Nutritional Plan: no  Consults: dietary/lactation following          Labs/Testing due today?: no    Sleep hours: 7.25       Participation in Care/Groups:  yes  Medication Compliant?: Yes  PRNS (last 24 hours): Antianxiety, Nicotine patch    Restraints (last 24 hours):  no  Substance Abuse:  no  CIWA (range last 24 hours):  COWS (range last 24 hours):   Alcohol screening (AUDIT) completed -  AUDIT Score: 0  If applicable, date SBIRT discussed in treatment team AND documented: N/A  Tobacco - patient is a smoker: no   Date tobacco education completed by RN: n/a  24 hour chart check complete: yes     Patient goal(s) for today: Discharge  Treatment team focus/goals: Schedule follow-up appointments; Discharge  LOS:  2  Expected LOS: 2  [address] -  No  Name of Decision maker if patient has Psychiatric Care Directive: None   Patient was offered information, patient declined. Financial concerns/prescription coverage: [employer]  Date of last family contact: None      Family requesting physician contact today: No  Discharge plan: Return home when stable for discharge       Outpatient provider(s):  To be linked; previously linked to [place] [id number]    Participating treatment team members: Deven Fiore, JUAN Worthy; Dr. Isis Bhandari MD; Verner Eaton, TURNER; Radha Brown, GalinaD; Dr. Irene Samuel, Family medicine resident

## 2017-07-14 NOTE — BH NOTES
GROUP THERAPY PROGRESS NOTE    Elfego Broussard participated in the General Unit's Process Group, with a focus identifying feelings and planning for the day. Group time: 75 minutes. Personal goal for participation: To increase the capacity to improve ones mood and structure. Goal orientation: The patient will be able to identify their feelings, develop a plan for structuring their day, and discharge planning. Group therapy participation: With prompting, this patient partially participated in the group. Therapeutic interventions reviewed and discussed: The group members were asked to introduce themselves to each other and to see if they could identify an emotion they are having and/or let the group know what they want to focus on for the day as they continue to make discharge plans. Impression of participation: The patient said she was feeling \"good\" and how she plans to acknowledge how hard it has been for her to feel like she was getting anywhere in her marriage by focusing on her  and what she thought he need to do. She agreed that while it might be important to continue throwing out lines of communication that if she continues to focus on her own personal development and understanding herself better, she might be able to find her  stepping up to the plate as well. The patient expressed no SI/HI and no overt psychotic symptoms. Her affect was slightly anxious when thinking about going home and her mood was confident and thoughtful. She is in the process of finalizing her aftercare plans.

## 2017-07-14 NOTE — PROGRESS NOTES
Problem: Depressed Mood (Adult/Pediatric)  Goal: *STG: Participates in treatment plan  Outcome: Progressing Towards Goal  Review meds, out on unit social w peers and staff. Thoughts organized, logical and goal directed. Mood and affect brighter describing hopeful.  Daily goal is to attend groups  Goal: *STG: Verbalizes anger, guilt, and other feelings in a constructive manor  Outcome: Progressing Towards Goal  Easily describes feelings of anxiety related to breast pumping and decision to stop  Goal: *STG: Attends activities and groups  Outcome: Progressing Towards Goal  Engaged and participating  Goal: *STG: Remains safe in hospital  Outcome: Progressing Towards Goal  No injury or harm  Goal: Interventions  Outcome: Progressing Towards Goal  Staff focus is on coping skills education and d/c planning

## 2017-07-14 NOTE — PROGRESS NOTES
Pharmacist Discharge Medication Reconciliation    Discharging Provider: Dr. Deon Hummel    Significant PMH:   Past Medical History:   Diagnosis Date    Depression     GERD (gastroesophageal reflux disease)     Peptic ulcer      Chief Complaint for this Admission:   Chief Complaint   Patient presents with    Suicidal     Allergies: Sulfa (sulfonamide antibiotics)    Discharge Medications:   Current Discharge Medication List        START taking these medications    Details   FLUoxetine (PROZAC) 10 mg capsule Take 1 Cap by mouth daily. Indications: GENERALIZED ANXIETY DISORDER, major depressive disorder  Qty: 15 Cap, Refills: 1      traZODone (DESYREL) 50 mg tablet Take 1 Tab by mouth nightly.  Indications: insomnia associated with depression  Qty: 15 Tab, Refills: 1           STOP taking these medications       sertraline (ZOLOFT) 50 mg tablet Comments:   Reason for Stopping:               The patient's chart, MAR and AVS were reviewed by Eran Clarke, JOSE MANUELD.

## 2017-07-14 NOTE — BH NOTES
Pt discharged per MD order. All belongings returned, upon discharge. Pt verbally contracts for safety. Copy of discharge instructions given to patient. Pt verbalizes understanding.
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
## 2017-07-14 NOTE — DISCHARGE INSTRUCTIONS
DISCHARGE SUMMARY    Kinjal Frias  : 1985  MRN: [id number]    The patient Lennie Wilder exhibits the ability to control behavior in a less restrictive environment. Patient's level of functioning is improving. No assaultive/destructive behavior has been observed for the past 24 hours. No suicidal/homicidal threat or behavior has been observed for the past 24 hours. There is no evidence of serious medication side effects. Patient has not been in physical or protective restraints for at least the past 24 hours. If weapons involved, how are they secured? No weapons involved. Is patient aware of and in agreement with discharge plan? Yes    Arrangements for medication:  Prescriptions given to patient. Copy of discharge instructions to  provider?:  Cindy Kumar ([phone]) and Dr. Alyssa Amador ([phone])    Arrangements for transportation home:   to     Keep all follow up appointments as scheduled, continue to take prescribed medications per physician instructions. Mental health crisis number:  [phone] or your local mental health crisis line number at [place] [phone] from Nurse    The following personal items are in your possession at time of discharge:    Dental Appliances: None  Visual Aid: None     Home Medications: None  Jewelry: None  Clothing: Pants, Shirt, Undergarments, With patient (2pants,2shirts,2undies)  Other Valuables: None  Personal Items Sent to Safe: none          PATIENT INSTRUCTIONS:        What to do at Home:  Recommended activity: Activity as tolerated,     If you experience any of the following symptoms thoughts of harming self, feeling overwhelmed with anxiety, sadness or hopelessness, please follow up with your local crisis number. Once you have an established relationship with your assigned providers incorporate them into your support system and crisis plan.  .      *  Please give a list of your current medications to your Primary Care Provider. *  Please update this list whenever your medications are discontinued, doses are      changed, or new medications (including over-the-counter products) are added. *  Please carry medication information at all times in case of emergency situations. These are general instructions for a healthy lifestyle:    No smoking/ No tobacco products/ Avoid exposure to second hand smoke    Surgeon General's Warning:  Quitting smoking now greatly reduces serious risk to your health. Obesity, smoking, and sedentary lifestyle greatly increases your risk for illness    A healthy diet, regular physical exercise & weight monitoring are important for maintaining a healthy lifestyle    You may be retaining fluid if you have a history of heart failure or if you experience any of the following symptoms:  Weight gain of 3 pounds or more overnight or 5 pounds in a week, increased swelling in our hands or feet or shortness of breath while lying flat in bed. Please call your doctor as soon as you notice any of these symptoms; do not wait until your next office visit. Recognize signs and symptoms of STROKE:    F-face looks uneven    A-arms unable to move or move unevenly    S-speech slurred or non-existent    T-time-call 911 as soon as signs and symptoms begin-DO NOT go       Back to bed or wait to see if you get better-TIME IS BRAIN. Warning Signs of HEART ATTACK     Call 911 if you have these symptoms:   Chest discomfort. Most heart attacks involve discomfort in the center of the chest that lasts more than a few minutes, or that goes away and comes back. It can feel like uncomfortable pressure, squeezing, fullness, or pain.  Discomfort in other areas of the upper body. Symptoms can include pain or discomfort in one or both arms, the back, neck, jaw, or stomach.  Shortness of breath with or without chest discomfort.    Other signs may include breaking out in a cold sweat, nausea, or lightheadedness. Don't wait more than five minutes to call 911 - MINUTES MATTER! Fast action can save your life. Calling 911 is almost always the fastest way to get lifesaving treatment. Emergency Medical Services staff can begin treatment when they arrive -- up to an hour sooner than if someone gets to the hospital by car. The discharge information has been reviewed with the patient. The patient verbalized understanding. Discharge medications reviewed with the patient and appropriate educational materials and side effects teaching were provided.

## 2017-07-31 NOTE — DISCHARGE SUMMARY
PSYCHIATRIC DISCHARGE SUMMARY         IDENTIFICATION:    Patient Name  Freida Lentz   Date of Birth 1985   [facility] [id number]   Medical Record Number  [id number]      Age  36 y.o. PCP None   Admit date:  2017    Discharge date: 17   Room Number  731/01  @ [facility]   Date of Service  17            TYPE OF DISCHARGE: REGULAR               CONDITION AT DISCHARGE: improved       PROVISIONAL & DISCHARGE DIAGNOSES:    Problem List  Never Reviewed          Codes Class    * (Principal)Depression, postpartum ICD-10-CM: F53  ICD-9-CM: 648.44, [facility] 6 Problems    *Depression, postpartum        DISCHARGE DIAGNOSIS:   Axis I:  SEE ABOVE  Axis II: SEE ABOVE  Axis III: SEE ABOVE  Axis IV:  Difficulty adjusting to multiple roles as new mom, employment stressors; Axis V:  47on admission, 57 on discharge 67(baseline)       CC & HISTORY OF PRESENT ILLNESS:  The patient, Freida Lentz, is a 36 y.o. WHITE OR  female with a past psychiatric history significant for MDD- postpartum onset, MIGUEL, who presents at this time with complaints of (and/or evidence of) the following emotional symptoms: depression, suicidal thoughts/threats and anxiety. Additional symptomatology include poor sleep, agitation, anxiety, SI and HI. The above symptoms have been present for several weeks. These symptoms are of moderate and high severity. These symptoms are constant in nature. The patient's condition has been precipitated by  losing his job, financial stressors, her own work stressors, poor sleep and psychosocial stressors. Patient's condition made worse by history of early trauma, lack of sufficient social supports,  at home. Additionally, patient complains of always feeling tired, even when her depression was better.      SOCIAL HISTORY:    Social History     Social History    Marital status:      Spouse name: N/A    Number of children: N/A    Years of education: N/A     Occupational History    Not on file. Social History Main Topics    Smoking status: Never Smoker    Smokeless tobacco: Not on file    Alcohol use Yes      Comment: rarely    Drug use: No    Sexual activity: Not on file     Other Topics Concern    Not on file     Social History Narrative      FAMILY HISTORY:   History reviewed. No pertinent family history. HOSPITALIZATION COURSE:    Sandra Pepe was admitted to the inpatient psychiatric unit [address] for acute psychiatric stabilization in regards to symptomatology as described in the HPI above. The differential diagnosis at time of admission included: MDD vs. Adjustment disorder. While on the unit Sandra Pepe was involved in individual, group, occupational and milieu therapy. Psychiatric medications were adjusted during this hospitalization including Prozac and Trazodone. Sandra Pepe demonstrated a very rapid and progressive improvement in overall condition. Much of patient's depression appeared to be related to situational stressors, sleep deprivation,  and psychological factors. Please see individual progress notes for more specific details regarding patient's hospitalization course. At time of discharge, Sandra Pepe is without significant problems of depression. Patient free of suicidal and homicidal ideations (appears to be at very low risk of suicide or homicide) and reports many positive predictive factors in terms of not attempting suicide or homicide. Overall presentation at time of discharge is most consistent with the diagnosis of MDD and related insomnia. Patient with request for discharge today. There are no grounds to seek a TDO. Patient has maximized benefit to be derived from acute inpatient psychiatric treatment.   All members of the treatment team concur with each other in regards to plans for discharge today per patient's request.  Patient and family are aware and in agreement with discharge and discharge plan. LABS AND IMAGING:    Labs Reviewed   METABOLIC PANEL, COMPREHENSIVE - Abnormal; Notable for the following:        Result Value    BUN/Creatinine ratio 22 (*)     Bilirubin, total 2.9 (*)     All other components within normal limits   URINALYSIS W/MICROSCOPIC - Abnormal; Notable for the following:     Appearance CLOUDY (*)     Protein TRACE (*)     Ketone TRACE (*)     Blood MODERATE (*)     Epithelial cells MANY (*)     Mucus TRACE (*)     All other components within normal limits   DRUG SCREEN, URINE - Abnormal; Notable for the following:     BENZODIAZEPINE POSITIVE (*)     All other components within normal limits   CBC WITH AUTOMATED DIFF   ETHYL ALCOHOL   BILIRUBIN, CONFIRM   TSH 3RD GENERATION   SAMPLES BEING HELD   HCG URINE, QL. - POC     No results found for: DS35, PHEN, PHENO, PHENT, DILF, DS39, PHENY, PTN, VALF2, VALAC, VALP, VALPR, DS6, CRBAM, CRBAMP, CARB2, XCRBAM  Admission on 07/12/2017, Discharged on 07/14/2017   Component Date Value Ref Range Status    WBC 07/12/2017 5.1  3.6 - 11.0 K/uL Final    RBC 07/12/2017 4.78  3.80 - 5.20 M/uL Final    HGB 07/12/2017 13.1  11.5 - 16.0 g/dL Final    HCT 07/12/2017 38.5  35.0 - 47.0 % Final    MCV 07/12/2017 80.5  80.0 - 99.0 FL Final    MCH 07/12/2017 27.4  26.0 - 34.0 PG Final    MCHC 07/12/2017 34.0  30.0 - 36.5 g/dL Final    RDW 07/12/2017 14.4  11.5 - 14.5 % Final    PLATELET 99/55/3237 715  150 - 400 K/uL Final    NEUTROPHILS 07/12/2017 64  32 - 75 % Final    LYMPHOCYTES 07/12/2017 27  12 - 49 % Final    MONOCYTES 07/12/2017 8  5 - 13 % Final    EOSINOPHILS 07/12/2017 1  0 - 7 % Final    BASOPHILS 07/12/2017 0  0 - 1 % Final    ABS. NEUTROPHILS 07/12/2017 3.2  1.8 - 8.0 K/UL Final    ABS. LYMPHOCYTES 07/12/2017 1.4  0.8 - 3.5 K/UL Final    ABS. MONOCYTES 07/12/2017 0.4  0.0 - 1.0 K/UL Final    ABS. EOSINOPHILS 07/12/2017 0.1  0.0 - 0.4 K/UL Final    ABS.  BASOPHILS 07/12/2017 0.0  0.0 - 0.1 K/UL Final    Sodium 07/12/2017 138  136 - 145 mmol/L Final    Potassium 07/12/2017 3.8  3.5 - 5.1 mmol/L Final    Chloride 07/12/2017 105  97 - 108 mmol/L Final    CO2 07/12/2017 25  21 - 32 mmol/L Final    Anion gap 07/12/2017 8  5 - 15 mmol/L Final    Glucose 07/12/2017 97  65 - 100 mg/dL Final    BUN 07/12/2017 20  6 - 20 MG/DL Final    Creatinine 07/12/2017 0.92  0.55 - 1.02 MG/DL Final    BUN/Creatinine ratio 07/12/2017 22* 12 - 20   Final    GFR est AA 07/12/2017 >60  >60 ml/min/1.73m2 Final    GFR est non-AA 07/12/2017 >60  >60 ml/min/1.73m2 Final    Calcium 07/12/2017 8.9  8.5 - 10.1 MG/DL Final    Bilirubin, total 07/12/2017 2.9* 0.2 - 1.0 MG/DL Final    ALT (SGPT) 07/12/2017 25  12 - 78 U/L Final    AST (SGOT) 07/12/2017 15  15 - 37 U/L Final    Alk.  phosphatase 07/12/2017 70  45 - 117 U/L Final    Protein, total 07/12/2017 8.0  6.4 - 8.2 g/dL Final    Albumin 07/12/2017 4.4  3.5 - 5.0 g/dL Final    Globulin 07/12/2017 3.6  2.0 - 4.0 g/dL Final    A-G Ratio 07/12/2017 1.2  1.1 - 2.2   Final    ALCOHOL(ETHYL),SERUM 07/12/2017 <10  <10 MG/DL Final    Color 07/12/2017 DARK YELLOW    Final    Appearance 07/12/2017 CLOUDY* CLEAR   Final    Specific gravity 07/12/2017 1.030  1.003 - 1.030   Final    pH (UA) 07/12/2017 6.0  5.0 - 8.0   Final    Protein 07/12/2017 TRACE* NEG mg/dL Final    Glucose 07/12/2017 NEGATIVE   NEG mg/dL Final    Ketone 07/12/2017 TRACE* NEG mg/dL Final    Blood 07/12/2017 MODERATE* NEG   Final    Urobilinogen 07/12/2017 1.0  0.2 - 1.0 EU/dL Final    Nitrites 07/12/2017 NEGATIVE   NEG   Final    Leukocyte Esterase 07/12/2017 NEGATIVE   NEG   Final    WBC 07/12/2017 5-10  0 - 4 /hpf Final    RBC 07/12/2017 0-5  0 - 5 /hpf Final    Epithelial cells 07/12/2017 MANY* FEW /lpf Final    Bacteria 07/12/2017 NEGATIVE   NEG /hpf Final    Mucus 07/12/2017 TRACE* NEG /lpf Final    AMPHETAMINES 07/12/2017 NEGATIVE   NEG   Final    BARBITURATES 07/12/2017 NEGATIVE   NEG   Final    BENZODIAZEPINE 07/12/2017 POSITIVE* NEG   Final    COCAINE 07/12/2017 NEGATIVE   NEG   Final    METHADONE 07/12/2017 NEGATIVE   NEG   Final    OPIATES 07/12/2017 NEGATIVE   NEG   Final    PCP(PHENCYCLIDINE) 07/12/2017 NEGATIVE   NEG   Final    THC (TH-CANNABINOL) 07/12/2017 NEGATIVE   NEG   Final    Drug screen comment 07/12/2017 (NOTE)   Final    Bilirubin UA, confirm 07/12/2017 NEGATIVE   NEG   Final    Pregnancy test,urine (POC) 07/12/2017 NEGATIVE   NEG   Final    TSH 07/12/2017 0.46  0.36 - 3.74 uIU/mL Final     No results found. DISPOSITION:    Home. Patient to f/u with drug/etoh rehabilitation, psychiatric, and psychotherapy appointments. Patient is to f/u with internist as directed. FOLLOW-UP CARE:    Activity as tolerated  Regular Diet  Wound Care: none needed. Follow-up Information     Follow up With Details Comments Contact Info    None   None ([phone]) Patient stated that they have no PCP      Violet Kebede LCSW On 7/19/2017 You have a 3:15pm appointment for therapy. Please remember to bring your photo ID and insurance card. [facility]  [address]  [address]  [phone]    Hakan Verde MD Schedule an appointment as soon as possible for a visit  [facility]   [phone]                   PROGNOSIS:   Gaylin Ing---- based on nature of patient's pathology/ies and treatment compliance issues. Prognosis is greatly dependent upon patient's ability to remain sober and to follow up with psychiatric/psychotherapy appointments as well as to comply with psychiatric medications as prescribed. DISCHARGE MEDICATIONS: (no changes made).     Informed consent given for the use of following psychotropic medications:  Discharge Medication List as of 7/17/2017  2:02 PM      START taking these medications    Details   FLUoxetine (PROZAC) 10 mg capsule Take 1 Cap by mouth daily. Indications: GENERALIZED ANXIETY DISORDER, major depressive disorder, Print, Disp-15 Cap, R-1      traZODone (DESYREL) 50 mg tablet Take 1 Tab by mouth nightly. Indications: insomnia associated with depression, Print, Disp-15 Tab, R-1         STOP taking these medications       sertraline (ZOLOFT) 50 mg tablet Comments:   Reason for Stopping:                      A coordinated, multidisplinary treatment team round was conducted with Angel Lawson is done daily here at [facility]. This team consists of the nurse, psychiatric unit pharmcist,  and writer. I have spent greater than 35 minutes on discharge work.     Signed:  Armando Perez MD  7/14/17

## 2018-01-25 ENCOUNTER — HOSPITAL ENCOUNTER (EMERGENCY)
Age: 33
Discharge: HOME | End: 2018-01-25
Attending: FAMILY MEDICINE

## 2018-01-25 VITALS
BODY MASS INDEX: 24.07 KG/M2 | DIASTOLIC BLOOD PRESSURE: 85 MMHG | TEMPERATURE: 98.3 F | HEART RATE: 78 BPM | RESPIRATION RATE: 20 BRPM | HEIGHT: 64 IN | SYSTOLIC BLOOD PRESSURE: 135 MMHG | OXYGEN SATURATION: 98 % | WEIGHT: 141 LBS

## 2018-01-25 DIAGNOSIS — Z87.09 H/O INTRINSIC ASTHMA: ICD-10-CM

## 2018-01-25 DIAGNOSIS — R05.3 CHRONIC COUGH: Primary | ICD-10-CM

## 2018-01-25 DIAGNOSIS — Z87.19 H/O GASTROESOPHAGEAL REFLUX (GERD): ICD-10-CM

## 2018-01-25 RX ORDER — CODEINE PHOSPHATE AND GUAIFENESIN 10; 100 MG/5ML; MG/5ML
5 SOLUTION ORAL
Qty: 120 ML | Refills: 0 | Status: SHIPPED | OUTPATIENT
Start: 2018-01-25

## 2018-01-25 RX ORDER — DEXTROAMPHETAMINE SACCHARATE, AMPHETAMINE ASPARTATE, DEXTROAMPHETAMINE SULFATE AND AMPHETAMINE SULFATE 7.5; 7.5; 7.5; 7.5 MG/1; MG/1; MG/1; MG/1
60 TABLET ORAL
COMMUNITY

## 2018-01-25 RX ORDER — OMEPRAZOLE 40 MG/1
40 CAPSULE, DELAYED RELEASE ORAL DAILY
Qty: 30 CAP | Refills: 0 | Status: SHIPPED | OUTPATIENT
Start: 2018-01-25

## 2018-01-25 RX ORDER — AZITHROMYCIN 500 MG/1
TABLET, FILM COATED ORAL
Qty: 3 TAB | Refills: 0 | Status: SHIPPED | OUTPATIENT
Start: 2018-01-25

## 2018-01-25 NOTE — DISCHARGE INSTRUCTIONS
Chronic Cough: Care Instructions  Your Care Instructions    A cough is your body's response to something that bothers your throat or airways. Many things can cause a cough. You might cough because of a cold or the flu, bronchitis, or asthma. Smoking, postnasal drip, allergies, and stomach acid that backs up into your throat also can cause a cough. A cough can be short-term (acute) or long-term (chronic). A chronic cough lasts more than 3 weeks. A chronic cough is often caused by a long-term problem, such as asthma. Another cause might be a medicine, such as an ACE inhibitor. A cough is a symptom, not a disease. To treat a chronic cough, you may need to treat the problem that causes it. You can take a few steps at home to cough less and feel better. Some people cough or clear their throat out of habit for no clear reason. Follow-up care is a key part of your treatment and safety. Be sure to make and go to all appointments, and call your doctor if you are having problems. It's also a good idea to know your test results and keep a list of the medicines you take. How can you care for yourself at home? · Drink plenty of water and other fluids. This may help soothe a dry or sore throat. Honey or lemon juice in hot water or tea may ease a dry cough. · Prop up your head on pillows to help you breathe and ease a cough. · Do not smoke or allow others to smoke around you. Smoke can make a cough worse. If you need help quitting, talk to your doctor about stop-smoking programs and medicines. These can increase your chances of quitting for good. · Avoid exposure to smoke, dust, or other pollutants, or wear a face mask. Check with your doctor or pharmacist to find out which type of face mask will give you the most benefit. · Take cough medicine as directed by your doctor. · Try cough drops to soothe a dry or sore throat. Cough drops don't stop a cough.  Medicine-flavored cough drops are no better than candy-flavored drops or hard candy. Throat clearing  When you have a chronic cough or a disease that may cause this type of cough, you may often feel like you want to clear your throat. This helps bring up mucus. But throat clearing does not always have a cause. Throat clearing can become a habit. The more you do it, the more you feel like you need to do it. But frequent throat clearing can be hard on your vocal cords. It's like slamming them together. To help lessen throat clearing, you can try:  · Taking small sips of water. · Not clearing your throat when you feel you need to. · Swallowing hard when you want to clear your throat. You may want to ask your doctor if a medicine that thins mucus would help. When should you call for help? Call 911 anytime you think you may need emergency care. For example, call if:  ? · You have severe trouble breathing. ?Call your doctor now or seek immediate medical care if:  ? · You cough up blood. ? · You have new or worse trouble breathing. ? · You have a new or higher fever. ? Watch closely for changes in your health, and be sure to contact your doctor if:  ? · You cough more deeply or more often, especially if you notice more mucus or a change in the color of your mucus. ? · You do not get better as expected. Where can you learn more? Go to http://gretchen-amor.info/. Enter D183 in the search box to learn more about \"Chronic Cough: Care Instructions. \"  Current as of: May 12, 2017  Content Version: 11.4  © 9920-9965 [employer] Care instructions adapted under license by [employer] (which disclaims liability or warranty for this information). If you have questions about a medical condition or this instruction, always ask your healthcare professional. [place] [address] any warranty or liability for your use of this information.        Gastroesophageal Reflux Disease (GERD): Care Instructions  Your Care Instructions    Gastroesophageal reflux disease (GERD) is the backward flow of stomach acid into the esophagus. The esophagus is the tube that leads from your throat to your stomach. A one-way valve prevents the stomach acid from moving up into this tube. When you have GERD, this valve does not close tightly enough. If you have mild GERD symptoms including heartburn, you may be able to control the problem with antacids or over-the-counter medicine. Changing your diet, losing weight, and making other lifestyle changes can also help reduce symptoms. Follow-up care is a key part of your treatment and safety. Be sure to make and go to all appointments, and call your doctor if you are having problems. It's also a good idea to know your test results and keep a list of the medicines you take. How can you care for yourself at home? · Take your medicines exactly as prescribed. Call your doctor if you think you are having a problem with your medicine. · Your doctor may recommend over-the-counter medicine. For mild or occasional indigestion, antacids, such as Tums, Gaviscon, Mylanta, or Maalox, may help. Your doctor also may recommend over-the-counter acid reducers, such as Pepcid AC, Tagamet HB, Zantac 75, or Prilosec. Read and follow all instructions on the label. If you use these medicines often, talk with your doctor. · Change your eating habits. ¨ It's best to eat several small meals instead of two or three large meals. ¨ After you eat, wait 2 to 3 hours before you lie down. ¨ Chocolate, mint, and alcohol can make GERD worse. ¨ Spicy foods, foods that have a lot of acid (like tomatoes and oranges), and coffee can make GERD symptoms worse in some people. If your symptoms are worse after you eat a certain food, you may want to stop eating that food to see if your symptoms get better. · Do not smoke or chew tobacco. Smoking can make GERD worse.  If you need help quitting, talk to your doctor about stop-smoking programs and medicines. These can increase your chances of quitting for good. · If you have GERD symptoms at night, raise the head of your bed 6 to 8 inches by putting the frame on blocks or placing a foam wedge under the head of your mattress. (Adding extra pillows does not work.)  · Do not wear tight clothing around your middle. · Lose weight if you need to. Losing just 5 to 10 pounds can help. When should you call for help? Call your doctor now or seek immediate medical care if:  ? · You have new or different belly pain. ? · Your stools are black and tarlike or have streaks of blood. ? Watch closely for changes in your health, and be sure to contact your doctor if:  ? · Your symptoms have not improved after 2 days. ? · Food seems to catch in your throat or chest.   Where can you learn more? Go to http://gretchen-amor.info/. Enter X100 in the search box to learn more about \"Gastroesophageal Reflux Disease (GERD): Care Instructions. \"  Current as of: May 12, 2017  Content Version: 11.4  © 0451-1572 [employer]. Care instructions adapted under license by [employer] (which disclaims liability or warranty for this information). If you have questions about a medical condition or this instruction, always ask your healthcare professional. [address] any warranty or liability for your use of this information.

## 2018-01-25 NOTE — UC PROVIDER NOTE
Patient is a 29 y.o. female presenting with cough. The history is provided by the patient. Cough   This is a chronic (off and on since november ) problem. The problem occurs every few minutes. The problem has been gradually worsening (recently ). The cough is non-productive. There has been no fever. Associated symptoms include chest pain (mid lower chest and epigastric pressure) and sore throat. Pertinent negatives include no ear congestion, no ear pain, no rhinorrhea, no shortness of breath and no wheezing. She has tried prescription drugs (tamiflu/ prednisone - recently prophylactically for exposure to flu ) for the symptoms. The treatment provided no relief. She is not a smoker. Her past medical history is significant for asthma. Past Medical History:   Diagnosis Date    Depression     GERD (gastroesophageal reflux disease)     Peptic ulcer         Past Surgical History:   Procedure Laterality Date    HX  SECTION  ,          History reviewed. No pertinent family history. Social History     Social History    Marital status:      Spouse name: N/A    Number of children: N/A    Years of education: N/A     Occupational History    Not on file. Social History Main Topics    Smoking status: Never Smoker    Smokeless tobacco: Not on file    Alcohol use Yes      Comment: rarely    Drug use: No    Sexual activity: Not on file     Other Topics Concern    Not on file     Social History Narrative                ALLERGIES: Doxycycline; Sulfa (sulfonamide antibiotics); and Tessalon [benzonatate]    Review of Systems   HENT: Positive for sore throat. Negative for ear pain and rhinorrhea. Respiratory: Positive for cough. Negative for shortness of breath and wheezing. Cardiovascular: Positive for chest pain (mid lower chest and epigastric pressure). All other systems reviewed and are negative.       Vitals:    18 1754   BP: 135/85   Pulse: 78   Resp: 20   Temp: 98.3 °F (36.8 °C)   SpO2: 98%   Weight: 64 kg (141 lb)   Height: 5' 4\" (1.626 m)       Physical Exam   Constitutional: No distress. HENT:   Right Ear: Tympanic membrane and ear canal normal.   Left Ear: Tympanic membrane and ear canal normal.   Nose: Nose normal.   Mouth/Throat: No oropharyngeal exudate, posterior oropharyngeal edema or posterior oropharyngeal erythema. Eyes: Conjunctivae are normal. Right eye exhibits no discharge. Left eye exhibits no discharge. Neck: Neck supple. Pulmonary/Chest: Effort normal and breath sounds normal. No respiratory distress. She has no wheezes. She has no rales. Lymphadenopathy:     She has no cervical adenopathy. Skin: No rash noted. Nursing note and vitals reviewed. MDM     Differential Diagnosis; Clinical Impression; Plan:     CLINICAL IMPRESSION:  Chronic cough  (primary encounter diagnosis)  H/O gastroesophageal reflux (GERD)  H/O intrinsic asthma      DDX- bronchitis- GERD- ? PLRD    Plan:    Lilyan Aebna robitussin AC- omeprazole  If sxs not resolved follow up     Amount and/or Complexity of Data Reviewed:    Review and summarize past medical records:  Yes  Risk of Significant Complications, Morbidity, and/or Mortality:   Presenting problems: Moderate  Management options:   Moderate  Progress:   Patient progress:  Stable      Procedures

## 2023-06-20 ENCOUNTER — HOSPITAL ENCOUNTER (EMERGENCY)
Facility: HOSPITAL | Age: 38
Discharge: HOME | End: 2023-06-20
Attending: EMERGENCY MEDICINE
Payer: COMMERCIAL

## 2023-06-20 VITALS
DIASTOLIC BLOOD PRESSURE: 75 MMHG | RESPIRATION RATE: 20 BRPM | SYSTOLIC BLOOD PRESSURE: 128 MMHG | WEIGHT: 173.06 LBS | OXYGEN SATURATION: 96 % | TEMPERATURE: 98.6 F | BODY MASS INDEX: 30.66 KG/M2 | HEART RATE: 81 BPM | HEIGHT: 63 IN

## 2023-06-20 DIAGNOSIS — F11.93 OPIATE WITHDRAWAL (HCC): Primary | ICD-10-CM

## 2023-06-20 PROCEDURE — 6370000000 HC RX 637 (ALT 250 FOR IP): Performed by: EMERGENCY MEDICINE

## 2023-06-20 PROCEDURE — 99283 EMERGENCY DEPT VISIT LOW MDM: CPT

## 2023-06-20 RX ORDER — DICYCLOMINE HYDROCHLORIDE 10 MG/1
10 CAPSULE ORAL 4 TIMES DAILY
Qty: 40 CAPSULE | Refills: 1 | Status: SHIPPED | OUTPATIENT
Start: 2023-06-20

## 2023-06-20 RX ORDER — DICYCLOMINE HCL 20 MG
20 TABLET ORAL ONCE
Status: COMPLETED | OUTPATIENT
Start: 2023-06-20 | End: 2023-06-20

## 2023-06-20 RX ORDER — ONDANSETRON 4 MG/1
4 TABLET, FILM COATED ORAL 3 TIMES DAILY PRN
Qty: 30 TABLET | Refills: 0 | Status: SHIPPED | OUTPATIENT
Start: 2023-06-20

## 2023-06-20 RX ORDER — BUPRENORPHINE AND NALOXONE 8; 2 MG/1; MG/1
1 FILM, SOLUBLE BUCCAL; SUBLINGUAL
Status: COMPLETED | OUTPATIENT
Start: 2023-06-20 | End: 2023-06-20

## 2023-06-20 RX ORDER — BUPRENORPHINE AND NALOXONE 8; 2 MG/1; MG/1
1 FILM, SOLUBLE BUCCAL; SUBLINGUAL 2 TIMES DAILY
Qty: 28 FILM | Refills: 0 | Status: SHIPPED | OUTPATIENT
Start: 2023-06-20 | End: 2023-07-04

## 2023-06-20 RX ADMIN — DICYCLOMINE HYDROCHLORIDE 20 MG: 20 TABLET ORAL at 10:36

## 2023-06-20 RX ADMIN — ALUMINUM HYDROXIDE, MAGNESIUM HYDROXIDE, AND SIMETHICONE 40 ML: 200; 200; 20 SUSPENSION ORAL at 10:36

## 2023-06-20 RX ADMIN — BUPRENORPHINE AND NALOXONE 1 FILM: 8; 2 FILM, SOLUBLE BUCCAL; SUBLINGUAL at 08:56

## 2023-06-20 ASSESSMENT — LIFESTYLE VARIABLES
HOW MANY STANDARD DRINKS CONTAINING ALCOHOL DO YOU HAVE ON A TYPICAL DAY: PATIENT DOES NOT DRINK
HOW OFTEN DO YOU HAVE A DRINK CONTAINING ALCOHOL: NEVER

## 2023-06-20 ASSESSMENT — PAIN SCALES - GENERAL: PAINLEVEL_OUTOF10: 6

## 2023-06-20 ASSESSMENT — PAIN - FUNCTIONAL ASSESSMENT: PAIN_FUNCTIONAL_ASSESSMENT: NONE - DENIES PAIN

## 2023-06-20 NOTE — ED NOTES
Spoke with [employer] with pt's consent about setting up outpatient Suboxone treatments at this time. Chart set up with [facility], pt will need to call [facility] to set up first appt.  After dc from this ED     Kale Rashid, [facility]  06/20/23 1413

## 2023-06-20 NOTE — ED PROVIDER NOTES
82 82 85   Resp: 20 16 25 26   Temp:       TempSrc:       SpO2: 98% 97% 96% 96%   Weight:       Height:                Patient was given the following medications:  Medications   dicyclomine (BENTYL) tablet 20 mg (has no administration in time range)   mylanta/viscous lidocaine (GI COCKTAIL) (has no administration in time range)   buprenorphine-naloxone (SUBOXONE) 8-2 MG SL film 1 Film (1 Film SubLINGual Given 6/20/23 0856)       CONSULTS: (Who and What was discussed)  None      Chronic Conditions: History of heroin abuse, history of alcohol abuse, takes Adderall for ADHD      Social Determinants affecting Dx or Tx: Patient has a substance abuse problem. Longstanding addiction to heroin, which she takes intranasal    Records Reviewed (source and summary of external notes): Nursing Notes and Old Medical Records    CC/HPI Summary, DDx, ED Course, and Reassessment: Patient is a 57-year-old female presenting with opiate withdrawal moderate intensity. Treated with Suboxone, oral medications, feeling much better after treatment. Will give referral to outpatient medications and treatment facility. Also provided numerous resources for opiate addiction, support system, peer recovery. Strict return precautions given. Disposition Considerations (Tests not done, Shared Decision Making, Pt Expectation of Test or Tx.):      FINAL IMPRESSION     1. Opiate withdrawal [facility])          DISPOSITION/PLAN   DISPOSITION Decision To Discharge 06/20/2023 10:00:48 AM      Discharge Note: The patient is stable for discharge home. The signs, symptoms, diagnosis, and discharge instructions have been discussed, understanding conveyed, and agreed upon. The patient is to follow up as recommended or return to ER should their symptoms worsen. PATIENT REFERRED TO:  [address] [place] [address]  [phone] [facility]  [phone]  In 1 week  follow-up appointment.   Please call this number and they

## 2023-06-20 NOTE — DISCHARGE INSTRUCTIONS
It was a pleasure taking care of you at [facility] Department today. We know that when you come to [facility], you are entrusting us with your health, comfort, and safety. Our physicians and nurses honor that trust, and we truly appreciate the opportunity to care for you and your loved ones. We also value your feedback. If you receive a survey about your Emergency Department experience today, please fill it out. We care about our patients' feedback, and we listen to what you have to say. Thank you!

## 2023-06-20 NOTE — ED NOTES
Provided pt with education on following up with [facility] and starting Suboxone tx. Opioid cessation packet given and education provided. Pt verbalized understanding and states she has no other questions at this time.  Mother will provide ride home     [facility]  06/20/23 3796

## 2024-11-22 ENCOUNTER — APPOINTMENT (OUTPATIENT)
Facility: HOSPITAL | Age: 39
DRG: 442 | End: 2024-11-22
Payer: COMMERCIAL

## 2024-11-22 ENCOUNTER — HOSPITAL ENCOUNTER (INPATIENT)
Facility: HOSPITAL | Age: 39
LOS: 2 days | Discharge: HOME | DRG: 442 | End: 2024-11-24
Attending: STUDENT IN AN ORGANIZED HEALTH CARE EDUCATION/TRAINING PROGRAM | Admitting: STUDENT IN AN ORGANIZED HEALTH CARE EDUCATION/TRAINING PROGRAM
Payer: COMMERCIAL

## 2024-11-22 DIAGNOSIS — E80.6 HYPERBILIRUBINEMIA: ICD-10-CM

## 2024-11-22 DIAGNOSIS — K80.80 BILIARY CALCULUS OF OTHER SITE WITHOUT OBSTRUCTION: Primary | ICD-10-CM

## 2024-11-22 LAB
ALBUMIN SERPL-MCNC: 4.3 G/DL (ref 3.5–5)
ALBUMIN/GLOB SERPL: 1.7 (ref 1.1–2.2)
ALP SERPL-CCNC: 34 U/L (ref 45–117)
ALT SERPL-CCNC: 12 U/L (ref 12–78)
ANION GAP SERPL CALC-SCNC: 4 MMOL/L (ref 2–12)
APPEARANCE UR: ABNORMAL
AST SERPL-CCNC: 9 U/L (ref 15–37)
BACTERIA URNS QL MICRO: ABNORMAL /HPF
BASOPHILS # BLD: 0 K/UL (ref 0–0.1)
BASOPHILS NFR BLD: 0 % (ref 0–1)
BILIRUB SERPL-MCNC: 3.3 MG/DL (ref 0.2–1)
BILIRUB UR QL CFM: NEGATIVE
BUN SERPL-MCNC: 12 MG/DL (ref 6–20)
BUN/CREAT SERPL: 15 (ref 12–20)
CALCIUM SERPL-MCNC: 9.2 MG/DL (ref 8.5–10.1)
CHLORIDE SERPL-SCNC: 103 MMOL/L (ref 97–108)
CO2 SERPL-SCNC: 30 MMOL/L (ref 21–32)
COLOR UR: ABNORMAL
CREAT SERPL-MCNC: 0.78 MG/DL (ref 0.55–1.02)
DIFFERENTIAL METHOD BLD: ABNORMAL
EOSINOPHIL # BLD: 0.1 K/UL (ref 0–0.4)
EOSINOPHIL NFR BLD: 2 % (ref 0–7)
EPITH CASTS URNS QL MICRO: ABNORMAL /LPF
ERYTHROCYTE [DISTWIDTH] IN BLOOD BY AUTOMATED COUNT: 12.4 % (ref 11.5–14.5)
GLOBULIN SER CALC-MCNC: 2.6 G/DL (ref 2–4)
GLUCOSE SERPL-MCNC: 92 MG/DL (ref 65–100)
GLUCOSE UR STRIP.AUTO-MCNC: NEGATIVE MG/DL
HCT VFR BLD AUTO: 36.3 % (ref 35–47)
HGB BLD-MCNC: 12.5 G/DL (ref 11.5–16)
HGB UR QL STRIP: ABNORMAL
IMM GRANULOCYTES # BLD AUTO: 0 K/UL (ref 0–0.04)
IMM GRANULOCYTES NFR BLD AUTO: 0 % (ref 0–0.5)
INR PPP: 1.2 (ref 0.9–1.1)
KETONES UR QL STRIP.AUTO: >80 MG/DL
LEUKOCYTE ESTERASE UR QL STRIP.AUTO: ABNORMAL
LIPASE SERPL-CCNC: 15 U/L (ref 13–75)
LYMPHOCYTES # BLD: 0.9 K/UL (ref 0.8–3.5)
LYMPHOCYTES NFR BLD: 17 % (ref 12–49)
MCH RBC QN AUTO: 28.5 PG (ref 26–34)
MCHC RBC AUTO-ENTMCNC: 34.4 G/DL (ref 30–36.5)
MCV RBC AUTO: 82.7 FL (ref 80–99)
MONOCYTES # BLD: 0.3 K/UL (ref 0–1)
MONOCYTES NFR BLD: 5 % (ref 5–13)
MUCOUS THREADS URNS QL MICRO: ABNORMAL /LPF
NEUTS SEG # BLD: 3.9 K/UL (ref 1.8–8)
NEUTS SEG NFR BLD: 76 % (ref 32–75)
NITRITE UR QL STRIP.AUTO: NEGATIVE
NRBC # BLD: 0 K/UL (ref 0–0.01)
NRBC BLD-RTO: 0 PER 100 WBC
PH UR STRIP: 5.5 (ref 5–8)
PLATELET # BLD AUTO: 190 K/UL (ref 150–400)
PMV BLD AUTO: 10.7 FL (ref 8.9–12.9)
POTASSIUM SERPL-SCNC: 3.3 MMOL/L (ref 3.5–5.1)
PROT SERPL-MCNC: 6.9 G/DL (ref 6.4–8.2)
PROT UR STRIP-MCNC: 100 MG/DL
PROTHROMBIN TIME: 12.5 SEC (ref 9–11.1)
RBC # BLD AUTO: 4.39 M/UL (ref 3.8–5.2)
RBC #/AREA URNS HPF: >100 /HPF (ref 0–5)
SODIUM SERPL-SCNC: 137 MMOL/L (ref 136–145)
SP GR UR REFRACTOMETRY: 1.03
URINE CULTURE IF INDICATED: ABNORMAL
UROBILINOGEN UR QL STRIP.AUTO: 1 EU/DL (ref 0.2–1)
WBC # BLD AUTO: 5.2 K/UL (ref 3.6–11)
WBC URNS QL MICRO: >100 /HPF (ref 0–4)

## 2024-11-22 PROCEDURE — 93005 ELECTROCARDIOGRAM TRACING: CPT | Performed by: STUDENT IN AN ORGANIZED HEALTH CARE EDUCATION/TRAINING PROGRAM

## 2024-11-22 PROCEDURE — 6370000000 HC RX 637 (ALT 250 FOR IP): Performed by: STUDENT IN AN ORGANIZED HEALTH CARE EDUCATION/TRAINING PROGRAM

## 2024-11-22 PROCEDURE — 96374 THER/PROPH/DIAG INJ IV PUSH: CPT

## 2024-11-22 PROCEDURE — 86706 HEP B SURFACE ANTIBODY: CPT

## 2024-11-22 PROCEDURE — 80053 COMPREHEN METABOLIC PANEL: CPT

## 2024-11-22 PROCEDURE — 81001 URINALYSIS AUTO W/SCOPE: CPT

## 2024-11-22 PROCEDURE — 99285 EMERGENCY DEPT VISIT HI MDM: CPT

## 2024-11-22 PROCEDURE — 86704 HEP B CORE ANTIBODY TOTAL: CPT

## 2024-11-22 PROCEDURE — 83690 ASSAY OF LIPASE: CPT

## 2024-11-22 PROCEDURE — 83010 ASSAY OF HAPTOGLOBIN QUANT: CPT

## 2024-11-22 PROCEDURE — 87086 URINE CULTURE/COLONY COUNT: CPT

## 2024-11-22 PROCEDURE — 85610 PROTHROMBIN TIME: CPT

## 2024-11-22 PROCEDURE — 6360000002 HC RX W HCPCS

## 2024-11-22 PROCEDURE — 86803 HEPATITIS C AB TEST: CPT

## 2024-11-22 PROCEDURE — 6360000002 HC RX W HCPCS: Performed by: STUDENT IN AN ORGANIZED HEALTH CARE EDUCATION/TRAINING PROGRAM

## 2024-11-22 PROCEDURE — 83615 LACTATE (LD) (LDH) ENZYME: CPT

## 2024-11-22 PROCEDURE — 1100000003 HC PRIVATE W/ TELEMETRY

## 2024-11-22 PROCEDURE — 87340 HEPATITIS B SURFACE AG IA: CPT

## 2024-11-22 PROCEDURE — 85025 COMPLETE CBC W/AUTO DIFF WBC: CPT

## 2024-11-22 PROCEDURE — 76705 ECHO EXAM OF ABDOMEN: CPT

## 2024-11-22 PROCEDURE — 2580000003 HC RX 258: Performed by: STUDENT IN AN ORGANIZED HEALTH CARE EDUCATION/TRAINING PROGRAM

## 2024-11-22 PROCEDURE — 87389 HIV-1 AG W/HIV-1&-2 AB AG IA: CPT

## 2024-11-22 PROCEDURE — 36415 COLL VENOUS BLD VENIPUNCTURE: CPT

## 2024-11-22 RX ORDER — CLONIDINE HYDROCHLORIDE 0.1 MG/1
0.1 TABLET ORAL NIGHTLY PRN
Status: DISCONTINUED | OUTPATIENT
Start: 2024-11-22 | End: 2024-11-24 | Stop reason: HOSPADM

## 2024-11-22 RX ORDER — CLONIDINE HYDROCHLORIDE 0.1 MG/1
0.1 TABLET ORAL NIGHTLY PRN
COMMUNITY

## 2024-11-22 RX ORDER — SODIUM CHLORIDE 0.9 % (FLUSH) 0.9 %
5-40 SYRINGE (ML) INJECTION EVERY 12 HOURS SCHEDULED
Status: DISCONTINUED | OUTPATIENT
Start: 2024-11-22 | End: 2024-11-24 | Stop reason: HOSPADM

## 2024-11-22 RX ORDER — POLYETHYLENE GLYCOL 3350 17 G/17G
17 POWDER, FOR SOLUTION ORAL DAILY PRN
Status: DISCONTINUED | OUTPATIENT
Start: 2024-11-22 | End: 2024-11-24 | Stop reason: HOSPADM

## 2024-11-22 RX ORDER — METRONIDAZOLE 500 MG/100ML
500 INJECTION, SOLUTION INTRAVENOUS EVERY 8 HOURS
Status: DISCONTINUED | OUTPATIENT
Start: 2024-11-22 | End: 2024-11-24

## 2024-11-22 RX ORDER — MORPHINE SULFATE 4 MG/ML
4 INJECTION, SOLUTION INTRAMUSCULAR; INTRAVENOUS ONCE
Status: COMPLETED | OUTPATIENT
Start: 2024-11-22 | End: 2024-11-22

## 2024-11-22 RX ORDER — SODIUM CHLORIDE 0.9 % (FLUSH) 0.9 %
5-40 SYRINGE (ML) INJECTION PRN
Status: DISCONTINUED | OUTPATIENT
Start: 2024-11-22 | End: 2024-11-24 | Stop reason: HOSPADM

## 2024-11-22 RX ORDER — ACETAMINOPHEN 650 MG/1
650 SUPPOSITORY RECTAL EVERY 6 HOURS PRN
Status: DISCONTINUED | OUTPATIENT
Start: 2024-11-22 | End: 2024-11-24 | Stop reason: HOSPADM

## 2024-11-22 RX ORDER — BUPRENORPHINE AND NALOXONE 8; 2 MG/1; MG/1
1 FILM, SOLUBLE BUCCAL; SUBLINGUAL DAILY
Status: DISCONTINUED | OUTPATIENT
Start: 2024-11-23 | End: 2024-11-24 | Stop reason: HOSPADM

## 2024-11-22 RX ORDER — ACETAMINOPHEN 325 MG/1
650 TABLET ORAL EVERY 6 HOURS PRN
Status: DISCONTINUED | OUTPATIENT
Start: 2024-11-22 | End: 2024-11-24 | Stop reason: HOSPADM

## 2024-11-22 RX ORDER — SODIUM CHLORIDE 9 MG/ML
INJECTION, SOLUTION INTRAVENOUS PRN
Status: DISCONTINUED | OUTPATIENT
Start: 2024-11-22 | End: 2024-11-24 | Stop reason: HOSPADM

## 2024-11-22 RX ORDER — OXYCODONE HYDROCHLORIDE 5 MG/1
5 TABLET ORAL EVERY 4 HOURS PRN
Status: DISCONTINUED | OUTPATIENT
Start: 2024-11-22 | End: 2024-11-24 | Stop reason: HOSPADM

## 2024-11-22 RX ORDER — DEXTROAMPHETAMINE SACCHARATE, AMPHETAMINE ASPARTATE MONOHYDRATE, DEXTROAMPHETAMINE SULFATE AND AMPHETAMINE SULFATE 1.25; 1.25; 1.25; 1.25 MG/1; MG/1; MG/1; MG/1
5 CAPSULE, EXTENDED RELEASE ORAL EVERY MORNING
COMMUNITY

## 2024-11-22 RX ORDER — ONDANSETRON 4 MG/1
4 TABLET, ORALLY DISINTEGRATING ORAL EVERY 8 HOURS PRN
Status: DISCONTINUED | OUTPATIENT
Start: 2024-11-22 | End: 2024-11-24 | Stop reason: HOSPADM

## 2024-11-22 RX ORDER — BUPRENORPHINE AND NALOXONE 8; 2 MG/1; MG/1
1 FILM, SOLUBLE BUCCAL; SUBLINGUAL DAILY
COMMUNITY

## 2024-11-22 RX ORDER — ONDANSETRON 2 MG/ML
4 INJECTION INTRAMUSCULAR; INTRAVENOUS ONCE
Status: COMPLETED | OUTPATIENT
Start: 2024-11-22 | End: 2024-11-22

## 2024-11-22 RX ORDER — ONDANSETRON 2 MG/ML
4 INJECTION INTRAMUSCULAR; INTRAVENOUS EVERY 6 HOURS PRN
Status: DISCONTINUED | OUTPATIENT
Start: 2024-11-22 | End: 2024-11-24 | Stop reason: HOSPADM

## 2024-11-22 RX ADMIN — ONDANSETRON 4 MG: 2 INJECTION INTRAMUSCULAR; INTRAVENOUS at 19:22

## 2024-11-22 RX ADMIN — MORPHINE SULFATE 4 MG: 4 INJECTION, SOLUTION INTRAMUSCULAR; INTRAVENOUS at 22:55

## 2024-11-22 RX ADMIN — SODIUM CHLORIDE, PRESERVATIVE FREE 10 ML: 5 INJECTION INTRAVENOUS at 23:04

## 2024-11-22 RX ADMIN — POTASSIUM BICARBONATE 20 MEQ: 782 TABLET, EFFERVESCENT ORAL at 22:55

## 2024-11-22 RX ADMIN — ACETAMINOPHEN 325MG 650 MG: 325 TABLET ORAL at 23:55

## 2024-11-22 RX ADMIN — METRONIDAZOLE 500 MG: 500 INJECTION, SOLUTION INTRAVENOUS at 23:16

## 2024-11-22 RX ADMIN — WATER 2000 MG: 1 INJECTION INTRAMUSCULAR; INTRAVENOUS; SUBCUTANEOUS at 22:57

## 2024-11-22 ASSESSMENT — PAIN SCALES - GENERAL
PAINLEVEL_OUTOF10: 3
PAINLEVEL_OUTOF10: 6
PAINLEVEL_OUTOF10: 1

## 2024-11-22 ASSESSMENT — PAIN DESCRIPTION - LOCATION
LOCATION: ABDOMEN
LOCATION: ABDOMEN

## 2024-11-22 ASSESSMENT — PAIN DESCRIPTION - ORIENTATION
ORIENTATION: RIGHT;UPPER
ORIENTATION: RIGHT;MID

## 2024-11-22 ASSESSMENT — PAIN DESCRIPTION - FREQUENCY: FREQUENCY: INTERMITTENT

## 2024-11-22 ASSESSMENT — LIFESTYLE VARIABLES
HOW OFTEN DO YOU HAVE A DRINK CONTAINING ALCOHOL: MONTHLY OR LESS
HOW MANY STANDARD DRINKS CONTAINING ALCOHOL DO YOU HAVE ON A TYPICAL DAY: 1 OR 2

## 2024-11-22 ASSESSMENT — PAIN - FUNCTIONAL ASSESSMENT: PAIN_FUNCTIONAL_ASSESSMENT: ACTIVITIES ARE NOT PREVENTED

## 2024-11-22 ASSESSMENT — PAIN DESCRIPTION - DESCRIPTORS
DESCRIPTORS: ACHING
DESCRIPTORS: ACHING;DULL

## 2024-11-22 ASSESSMENT — PAIN DESCRIPTION - PAIN TYPE: TYPE: ACUTE PAIN

## 2024-11-23 ENCOUNTER — APPOINTMENT (OUTPATIENT)
Facility: HOSPITAL | Age: 39
DRG: 442 | End: 2024-11-23
Payer: COMMERCIAL

## 2024-11-23 LAB
-: NORMAL
ALBUMIN SERPL-MCNC: 3.8 G/DL (ref 3.5–5)
ALBUMIN/GLOB SERPL: 1.3 (ref 1.1–2.2)
ALP SERPL-CCNC: 33 U/L (ref 45–117)
ALT SERPL-CCNC: 14 U/L (ref 12–78)
ANION GAP SERPL CALC-SCNC: 5 MMOL/L (ref 2–12)
AST SERPL-CCNC: 12 U/L (ref 15–37)
BACTERIA SPEC CULT: NORMAL
BASOPHILS # BLD: 0.1 K/UL (ref 0–0.1)
BASOPHILS NFR BLD: 1 % (ref 0–1)
BILIRUB DIRECT SERPL-MCNC: 0.3 MG/DL (ref 0–0.2)
BILIRUB SERPL-MCNC: 3 MG/DL (ref 0.2–1)
BUN SERPL-MCNC: 8 MG/DL (ref 6–20)
BUN/CREAT SERPL: 11 (ref 12–20)
CALCIUM SERPL-MCNC: 8.9 MG/DL (ref 8.5–10.1)
CHLORIDE SERPL-SCNC: 105 MMOL/L (ref 97–108)
CO2 SERPL-SCNC: 28 MMOL/L (ref 21–32)
CREAT SERPL-MCNC: 0.7 MG/DL (ref 0.55–1.02)
DIFFERENTIAL METHOD BLD: NORMAL
EKG ATRIAL RATE: 84 BPM
EKG DIAGNOSIS: NORMAL
EKG P AXIS: 45 DEGREES
EKG P-R INTERVAL: 110 MS
EKG Q-T INTERVAL: 360 MS
EKG QRS DURATION: 94 MS
EKG QTC CALCULATION (BAZETT): 425 MS
EKG R AXIS: 96 DEGREES
EKG T AXIS: 65 DEGREES
EKG VENTRICULAR RATE: 84 BPM
EOSINOPHIL # BLD: 0.2 K/UL (ref 0–0.4)
EOSINOPHIL NFR BLD: 4 % (ref 0–7)
ERYTHROCYTE [DISTWIDTH] IN BLOOD BY AUTOMATED COUNT: 12.6 % (ref 11.5–14.5)
GLOBULIN SER CALC-MCNC: 3 G/DL (ref 2–4)
GLUCOSE SERPL-MCNC: 105 MG/DL (ref 65–100)
HAPTOGLOB SERPL-MCNC: 60 MG/DL (ref 30–200)
HAV IGM SER QL: NONREACTIVE
HBV CORE IGM SER QL: NONREACTIVE
HBV SURFACE AG SER QL: <0.1 INDEX
HBV SURFACE AG SER QL: NEGATIVE
HCT VFR BLD AUTO: 37.7 % (ref 35–47)
HCV AB SER IA-ACNC: 0.2 INDEX
HCV AB SERPL QL IA: NONREACTIVE
HGB BLD-MCNC: 13.1 G/DL (ref 11.5–16)
HIV 1+2 AB+HIV1 P24 AG SERPL QL IA: NONREACTIVE
HIV 1/2 RESULT COMMENT: NORMAL
IMM GRANULOCYTES # BLD AUTO: 0 K/UL (ref 0–0.04)
IMM GRANULOCYTES NFR BLD AUTO: 0 % (ref 0–0.5)
LDH SERPL L TO P-CCNC: 175 U/L (ref 81–246)
LYMPHOCYTES # BLD: 1.4 K/UL (ref 0.8–3.5)
LYMPHOCYTES NFR BLD: 26 % (ref 12–49)
MCH RBC QN AUTO: 28.5 PG (ref 26–34)
MCHC RBC AUTO-ENTMCNC: 34.7 G/DL (ref 30–36.5)
MCV RBC AUTO: 82.1 FL (ref 80–99)
MONOCYTES # BLD: 0.4 K/UL (ref 0–1)
MONOCYTES NFR BLD: 7 % (ref 5–13)
NEUTS SEG # BLD: 3.3 K/UL (ref 1.8–8)
NEUTS SEG NFR BLD: 62 % (ref 32–75)
NRBC # BLD: 0 K/UL (ref 0–0.01)
NRBC BLD-RTO: 0 PER 100 WBC
PLATELET # BLD AUTO: 218 K/UL (ref 150–400)
PMV BLD AUTO: 10.3 FL (ref 8.9–12.9)
POTASSIUM SERPL-SCNC: 3.7 MMOL/L (ref 3.5–5.1)
PROT SERPL-MCNC: 6.8 G/DL (ref 6.4–8.2)
RBC # BLD AUTO: 4.59 M/UL (ref 3.8–5.2)
SERVICE CMNT-IMP: NORMAL
SODIUM SERPL-SCNC: 138 MMOL/L (ref 136–145)
WBC # BLD AUTO: 5.4 K/UL (ref 3.6–11)

## 2024-11-23 PROCEDURE — 80076 HEPATIC FUNCTION PANEL: CPT

## 2024-11-23 PROCEDURE — A9579 GAD-BASE MR CONTRAST NOS,1ML: HCPCS | Performed by: STUDENT IN AN ORGANIZED HEALTH CARE EDUCATION/TRAINING PROGRAM

## 2024-11-23 PROCEDURE — 36415 COLL VENOUS BLD VENIPUNCTURE: CPT

## 2024-11-23 PROCEDURE — 2580000003 HC RX 258: Performed by: STUDENT IN AN ORGANIZED HEALTH CARE EDUCATION/TRAINING PROGRAM

## 2024-11-23 PROCEDURE — 80048 BASIC METABOLIC PNL TOTAL CA: CPT

## 2024-11-23 PROCEDURE — 6360000004 HC RX CONTRAST MEDICATION: Performed by: STUDENT IN AN ORGANIZED HEALTH CARE EDUCATION/TRAINING PROGRAM

## 2024-11-23 PROCEDURE — 6370000000 HC RX 637 (ALT 250 FOR IP): Performed by: STUDENT IN AN ORGANIZED HEALTH CARE EDUCATION/TRAINING PROGRAM

## 2024-11-23 PROCEDURE — 85025 COMPLETE CBC W/AUTO DIFF WBC: CPT

## 2024-11-23 PROCEDURE — 6360000002 HC RX W HCPCS: Performed by: INTERNAL MEDICINE

## 2024-11-23 PROCEDURE — 80074 ACUTE HEPATITIS PANEL: CPT

## 2024-11-23 PROCEDURE — 6360000002 HC RX W HCPCS: Performed by: STUDENT IN AN ORGANIZED HEALTH CARE EDUCATION/TRAINING PROGRAM

## 2024-11-23 PROCEDURE — 74183 MRI ABD W/O CNTR FLWD CNTR: CPT

## 2024-11-23 PROCEDURE — 2580000003 HC RX 258: Performed by: INTERNAL MEDICINE

## 2024-11-23 PROCEDURE — 1100000003 HC PRIVATE W/ TELEMETRY

## 2024-11-23 RX ADMIN — OXYCODONE 5 MG: 5 TABLET ORAL at 13:20

## 2024-11-23 RX ADMIN — OXYCODONE 5 MG: 5 TABLET ORAL at 08:08

## 2024-11-23 RX ADMIN — SODIUM CHLORIDE, PRESERVATIVE FREE 5 ML: 5 INJECTION INTRAVENOUS at 22:41

## 2024-11-23 RX ADMIN — BUPRENORPHINE AND NALOXONE 1 FILM: 8; 2 FILM, SOLUBLE BUCCAL; SUBLINGUAL at 09:22

## 2024-11-23 RX ADMIN — METRONIDAZOLE 500 MG: 500 INJECTION, SOLUTION INTRAVENOUS at 06:22

## 2024-11-23 RX ADMIN — METRONIDAZOLE 500 MG: 500 INJECTION, SOLUTION INTRAVENOUS at 22:40

## 2024-11-23 RX ADMIN — METRONIDAZOLE 500 MG: 500 INJECTION, SOLUTION INTRAVENOUS at 14:40

## 2024-11-23 RX ADMIN — SODIUM CHLORIDE, PRESERVATIVE FREE 10 ML: 5 INJECTION INTRAVENOUS at 08:09

## 2024-11-23 RX ADMIN — SODIUM CHLORIDE: 9 INJECTION, SOLUTION INTRAVENOUS at 22:40

## 2024-11-23 RX ADMIN — OXYCODONE 5 MG: 5 TABLET ORAL at 00:35

## 2024-11-23 RX ADMIN — OXYCODONE 5 MG: 5 TABLET ORAL at 19:28

## 2024-11-23 RX ADMIN — GADOTERIDOL 15 ML: 279.3 INJECTION, SOLUTION INTRAVENOUS at 13:09

## 2024-11-23 RX ADMIN — PANTOPRAZOLE SODIUM 40 MG: 40 INJECTION, POWDER, FOR SOLUTION INTRAVENOUS at 11:23

## 2024-11-23 RX ADMIN — WATER 2000 MG: 1 INJECTION INTRAMUSCULAR; INTRAVENOUS; SUBCUTANEOUS at 22:41

## 2024-11-23 ASSESSMENT — PAIN DESCRIPTION - ORIENTATION
ORIENTATION: RIGHT
ORIENTATION: RIGHT;UPPER
ORIENTATION: RIGHT;UPPER
ORIENTATION: RIGHT
ORIENTATION: UPPER;RIGHT

## 2024-11-23 ASSESSMENT — PAIN DESCRIPTION - DESCRIPTORS
DESCRIPTORS: ACHING

## 2024-11-23 ASSESSMENT — PAIN SCALES - GENERAL
PAINLEVEL_OUTOF10: 3
PAINLEVEL_OUTOF10: 2
PAINLEVEL_OUTOF10: 4
PAINLEVEL_OUTOF10: 5
PAINLEVEL_OUTOF10: 7
PAINLEVEL_OUTOF10: 6
PAINLEVEL_OUTOF10: 0
PAINLEVEL_OUTOF10: 6

## 2024-11-23 ASSESSMENT — PAIN DESCRIPTION - LOCATION
LOCATION: ABDOMEN;RIB CAGE
LOCATION: ABDOMEN
LOCATION: SHOULDER;ABDOMEN

## 2024-11-23 ASSESSMENT — PAIN - FUNCTIONAL ASSESSMENT: PAIN_FUNCTIONAL_ASSESSMENT: ACTIVITIES ARE NOT PREVENTED

## 2024-11-23 NOTE — ED TRIAGE NOTES
Pt presents ambulatory to triage w/ complaints of juandice, nausea, and intermittent RUQ ABD pain x2 days. Pt states she was seen at a stand alone ER and referred her for ultrasound, gallstone rule-out.

## 2024-11-23 NOTE — CONSULTS
[facility]  Vini Gonsalez M.D.  [phone]          GASTROENTEROLOGY CONSULTATION NOTE      NAME:  Leslye Chicas   :   1985   MRN:   [id number]       Referring Physician: Steven    Consult Date: 2024     Chief Complaint: ruq pain    History of Present Illness:  Patient is a 39 y.o. with a history of heroin use on Suboxone presenting with jaundice and RUQ pain. No NSAID use. Rare EtOH use. Last heroin over 1 year ago. Only new medication is nitrofurantoin for UTI took 3 days worth. Pt with jaundice without pale stool or dark urine. No blood in bowel movements. No FH of liver disease. No personal history of liver disease. History of PUD when she was a child due to NSAID use.    PMH:  Past Medical History:   Diagnosis Date    Encounter for monitoring Suboxone maintenance therapy        PSH:  History reviewed. No pertinent surgical history.    Allergies:  Allergies   Allergen Reactions    Doxycycline Anaphylaxis    Sulfa Antibiotics Rash       Home Medications:  Prior to Admission Medications   Prescriptions Last Dose Informant Patient Reported? Taking?   amphetamine-dextroamphetamine (ADDERALL XR) 5 MG extended release capsule 2024  Yes Yes   Sig: Take 1 capsule by mouth every morning. Max Daily Amount: 5 mg   buprenorphine-naloxone (SUBOXONE) 8-2 MG FILM SL film 2024  Yes Yes   Sig: Place 1 Film under the tongue daily. Max Daily Amount: 1 Film   cloNIDine (CATAPRES) 0.1 MG tablet 2024  Yes Yes   Sig: Take 1 tablet by mouth nightly as needed for Other (sleep/ anxiety)   dicyclomine (BENTYL) 10 MG capsule   No No   Sig: Take 1 capsule by mouth 4 times daily   ondansetron (ZOFRAN) 4 MG tablet Unknown  No No   Sig: Take 1 tablet by mouth 3 times daily as needed for Nausea or Vomiting      Facility-Administered Medications: None       Hospital Medications:  Current Facility-Administered Medications   Medication Dose Route Frequency    buprenorphine-naloxone

## 2024-11-23 NOTE — ED NOTES
Pt ambulatory to bathroom. Urine sample provided. Pt back in bed, bed wheels locked, bed in lowest position, side rails in upright position x2, pt on the monitor x2, and call bell within reach.

## 2024-11-23 NOTE — ED PROVIDER NOTES
Notes and Old Medical Records reviewed records from [facility] ED from 11/21/2024 when patient had negative CT scan     MDM: CC/HPI Summary, Chronic Conditions, DDx, ED Course, and Reassessment. Disposition Considerations (Tests not done, Shared Decision Making, Pt Expectation of Test or Tx.):     Leslye Chicas is a 39 y.o. female with PMHx of Suboxone use who presents to the ED today for concerns regarding some intermittent RUQ abdominal pain x 2 days.  Patient also endorses some nausea and states her  and daughter noted some yellowing of her patient's eyes.  Patient went to stand-alone ER yesterday and was told she had some gallstones and they referred her to this ED for ultrasound specifically of the RUQ.  Patient states she is also currently being treated for a UTI.  Patient was started on Macrobid by urgent care and then was switched to Keflex.  Patient has not started the prescription for Keflex, but picked it up today.  Patient denies fevers, chills, headache, sore throat, cough, chest pain, flank pain, vaginal discharge.  Patient denies nausea at this moment.  No recent weight loss.    On exam, well-appearing 39-year-old  female sitting up in bed no acute distress.  Faint scleral icterus noted bilaterally.  No significant abdominal tenderness, but mild TTP to RUQ with deep palpation.  DDx:, Cholelithiasis, cholecystitis, choledocho.  Ordered RUQ ultrasound CBC, CMP, lipase, and repeated UA.  Triage also ordered EKG.    Per nursing staff, patient complaining of nausea-ordered Zofran IV.  UA shows moderate leukocytes and greater than 100 WBCs-patient still has UTI.  RUQ ultrasound shows cholelithiasis with no evidence of acute cholecystitis.  EKG shows sinus rhythm with short NJ.  CBC unremarkable with no leukocytosis, but CMP shows significantly elevated bilirubin of 3.3.  Discussed with Dr. Rubio, who recommended discussion with hospitalist for possible admission d/t elevated bili.    ED

## 2024-11-23 NOTE — ED NOTES
Report given to CASI Martin (oncoming nurse) by CASI Martin (offgoing nurse). Report included the following information Nurse Handoff Report, ED Encounter Summary, ED SBAR, and MAR.

## 2024-11-23 NOTE — ED NOTES
Pt alert, conversational, and in no acute distress. Pt resting in bed, bed wheels locked, bed in lowest position, side rails in upright position x2, pt on the monitor x2, and call bell within reach.

## 2024-11-23 NOTE — ED NOTES
Report given to CASI Clark by Mario Grant RN. Nurse was informed of reason for arrival, vitals, labs, medications, orders, procedures, results, anything left pending and current plan of action. Questions were asked and received prior to departure from the patient.

## 2024-11-23 NOTE — H&P
pertinent family history.  No Known Allergies     Prior to Admission medications    Medication Sig Start Date End Date Taking? Authorizing Provider   buprenorphine-naloxone (SUBOXONE) 8-2 MG FILM SL film Place 1 Film under the tongue daily. Max Daily Amount: 1 Film   Yes Brenden Paul MD   amphetamine-dextroamphetamine (ADDERALL XR) 5 MG extended release capsule Take 1 capsule by mouth every morning. Max Daily Amount: 5 mg   Yes Brenden Paul MD   cloNIDine (CATAPRES) 0.1 MG tablet Take 1 tablet by mouth nightly as needed for Other (sleep/ anxiety)   Yes ProviderBrenden MD   ondansetron (ZOFRAN) 4 MG tablet Take 1 tablet by mouth 3 times daily as needed for Nausea or Vomiting 23   Swapnil Shi MD         Objective:   VITALS:    Patient Vitals for the past 24 hrs:   BP Temp Temp src Pulse Resp SpO2 Height Weight   24 2200 (!) 136/95 -- -- -- -- 100 % -- --   24 126/80 -- -- -- -- 100 % -- --   24 -- -- -- -- -- 100 % -- --   24 -- -- -- -- -- 100 % -- --   24 -- -- -- -- -- 99 % -- --   24 -- -- -- -- -- 100 % -- --   24 126/83 -- -- -- -- 96 % -- --   24 127/76 -- -- -- -- 99 % -- --   24 130/77 -- -- -- -- 99 % -- --   24 130/77 -- -- -- -- 100 % -- --   24 127/79 -- -- -- -- 100 % -- --   24 121/77 -- -- -- -- 100 % -- --   24 114/72 -- -- -- -- 99 % -- --   11/22/24 1845 123/74 -- -- 78 -- 99 % -- --   24 1826 130/77 -- -- 90 16 100 % -- --   24 1757 122/77 98.1 °F (36.7 °C) Oral 84 18 100 % 1.626 m (5' 4\") 70.3 kg (155 lb)       Temp (24hrs), Av.1 °F (36.7 °C), Min:98.1 °F (36.7 °C), Max:98.1 °F (36.7 °C)           Wt Readings from Last 12 Encounters:   24 70.3 kg (155 lb)   23 78.5 kg (173 lb 1 oz)         PHYSICAL EXAM:  General:    Alert, cooperative, well-appearing young female in no apparent distress

## 2024-11-24 VITALS
RESPIRATION RATE: 16 BRPM | TEMPERATURE: 98.1 F | HEART RATE: 62 BPM | SYSTOLIC BLOOD PRESSURE: 109 MMHG | OXYGEN SATURATION: 99 % | BODY MASS INDEX: 26.46 KG/M2 | HEIGHT: 64 IN | WEIGHT: 155 LBS | DIASTOLIC BLOOD PRESSURE: 56 MMHG

## 2024-11-24 LAB
ALBUMIN SERPL-MCNC: 3.6 G/DL (ref 3.5–5)
ALBUMIN/GLOB SERPL: 1.3 (ref 1.1–2.2)
ALP SERPL-CCNC: 32 U/L (ref 45–117)
ALT SERPL-CCNC: 10 U/L (ref 12–78)
AST SERPL-CCNC: 7 U/L (ref 15–37)
BILIRUB DIRECT SERPL-MCNC: 0.3 MG/DL (ref 0–0.2)
BILIRUB SERPL-MCNC: 1.7 MG/DL (ref 0.2–1)
GLOBULIN SER CALC-MCNC: 2.8 G/DL (ref 2–4)
INR PPP: 1.2 (ref 0.9–1.1)
PROT SERPL-MCNC: 6.4 G/DL (ref 6.4–8.2)
PROTHROMBIN TIME: 12.3 SEC (ref 9–11.1)

## 2024-11-24 PROCEDURE — 6360000002 HC RX W HCPCS: Performed by: INTERNAL MEDICINE

## 2024-11-24 PROCEDURE — 2580000003 HC RX 258: Performed by: INTERNAL MEDICINE

## 2024-11-24 PROCEDURE — 80076 HEPATIC FUNCTION PANEL: CPT

## 2024-11-24 PROCEDURE — 6370000000 HC RX 637 (ALT 250 FOR IP): Performed by: STUDENT IN AN ORGANIZED HEALTH CARE EDUCATION/TRAINING PROGRAM

## 2024-11-24 PROCEDURE — 2580000003 HC RX 258: Performed by: STUDENT IN AN ORGANIZED HEALTH CARE EDUCATION/TRAINING PROGRAM

## 2024-11-24 PROCEDURE — 36415 COLL VENOUS BLD VENIPUNCTURE: CPT

## 2024-11-24 PROCEDURE — 85610 PROTHROMBIN TIME: CPT

## 2024-11-24 PROCEDURE — 6360000002 HC RX W HCPCS: Performed by: STUDENT IN AN ORGANIZED HEALTH CARE EDUCATION/TRAINING PROGRAM

## 2024-11-24 RX ORDER — CIPROFLOXACIN 500 MG/1
500 TABLET, FILM COATED ORAL 2 TIMES DAILY
Qty: 14 TABLET | Refills: 0 | Status: SHIPPED | OUTPATIENT
Start: 2024-11-24 | End: 2024-12-01

## 2024-11-24 RX ADMIN — METRONIDAZOLE 500 MG: 500 INJECTION, SOLUTION INTRAVENOUS at 06:28

## 2024-11-24 RX ADMIN — SODIUM CHLORIDE, PRESERVATIVE FREE 10 ML: 5 INJECTION INTRAVENOUS at 09:24

## 2024-11-24 RX ADMIN — OXYCODONE 5 MG: 5 TABLET ORAL at 10:53

## 2024-11-24 RX ADMIN — PANTOPRAZOLE SODIUM 40 MG: 40 INJECTION, POWDER, FOR SOLUTION INTRAVENOUS at 09:17

## 2024-11-24 RX ADMIN — BUPRENORPHINE AND NALOXONE 1 FILM: 8; 2 FILM, SOLUBLE BUCCAL; SUBLINGUAL at 09:16

## 2024-11-24 ASSESSMENT — PAIN SCALES - GENERAL
PAINLEVEL_OUTOF10: 3
PAINLEVEL_OUTOF10: 0

## 2024-11-24 ASSESSMENT — PAIN DESCRIPTION - PAIN TYPE: TYPE: ACUTE PAIN

## 2024-11-24 ASSESSMENT — PAIN DESCRIPTION - DESCRIPTORS
DESCRIPTORS: ACHING
DESCRIPTORS: ACHING

## 2024-11-24 ASSESSMENT — PAIN - FUNCTIONAL ASSESSMENT
PAIN_FUNCTIONAL_ASSESSMENT: ACTIVITIES ARE NOT PREVENTED
PAIN_FUNCTIONAL_ASSESSMENT: ACTIVITIES ARE NOT PREVENTED

## 2024-11-24 ASSESSMENT — PAIN DESCRIPTION - DIRECTION: RADIATING_TOWARDS: BACK

## 2024-11-24 ASSESSMENT — PAIN DESCRIPTION - ORIENTATION
ORIENTATION: RIGHT
ORIENTATION: RIGHT

## 2024-11-24 ASSESSMENT — PAIN DESCRIPTION - LOCATION
LOCATION: SHOULDER
LOCATION: SHOULDER;BACK

## 2024-11-24 NOTE — CARE COORDINATION
Care Management Initial Assessment       RUR:  7%  Readmission? No  1st IM letter given? No  1st  letter given: No    Chart reviewed. Patient to discharge home.    CM met with patient to discuss discharge planning. Mother in the room and states she will assist patient, if needed. Will transport home. Patient denied any concerns about obtaining medications. Discussed need for PCP with her. No dc needs for CM identified.    Okay to dc from CM standpoint.      11/24/24 1220   Service Assessment   Patient Orientation Alert and Oriented   Cognition Alert   History Provided By Patient   Primary Caregiver Self   Support Systems Spouse/Significant Other;Family Members   Patient's Healthcare Decision Maker is: Patient Declined (Legal Next of Kin Remains as Decision Maker)   PCP Verified by CM No  (patient states she does not have a PCP)   Prior Functional Level Independent in ADLs/IADLs   Current Functional Level Independent in ADLs/IADLs   Can patient return to prior living arrangement Yes   Ability to make needs known: Good   Family able to assist with home care needs: Yes   Would you like for me to discuss the discharge plan with any other family members/significant others, and if so, who? Yes  (her mother was in the room)   Financial Resources Other (Comment)  (private insurance)   Community Resources None   Social/Functional History   Lives With Spouse   Type of Home House   Home Equipment None   Active  Yes   Discharge Planning   Type of Residence House   Living Arrangements Spouse/Significant Other   Current Services Prior To Admission None   DME Ordered? No   Type of Home Care Services None   Patient expects to be discharged to: House   Services At/After Discharge   Transition of Care Consult (CM Consult) Discharge Planning   Services At/After Discharge None   [place] Resource Information Provided? No   Mode of Transport at Discharge Other (see comment)  (her mother is in the room and will transport her

## 2024-11-24 NOTE — PROGRESS NOTES
End of Shift Note    Bedside shift change report given to Goran GARCIA (oncoming nurse) by Amber Fuentes RN (offgoing nurse).  Report included the following information SBAR REPORTS LIST: SBAR, ED Summary, OR Summary, Procedure Summary, Intake/Output, MAR, Recent Results, Med Rec Status, and Cardiac Rhythm (NSR)    Shift worked:  6490-9142     Shift summary and any significant changes:     Pt had a calm night, abdominal pain well controlled with PRN pain medications. MRI screening done for a possible MRCP later today.     Concerns for physician to address:  None     Zone phone for oncoming shift:   [phone]           Length of Stay:  Expected LOS: 2  Actual LOS: 1      Amber Fuentes RN                            
End of Shift Note    Bedside shift change report given to Henrique LANCE (oncoming nurse) by Goran Gibson LPN (offgoing nurse).  Report included the following information SBAR, Kardex, and MAR    Shift worked:  7 a to 7 p     Shift summary and any significant changes:     Oxycodone given x3 for RUQ ABD pain. MRI completed today.     Concerns for physician to address:  None     Zone phone for oncoming shift:   [phone]       Activity:  Level of Assistance: Independent  Number times ambulated in hallways past shift: 1  Number of times OOB to chair past shift: 1    Cardiac:   Cardiac Monitoring: Yes           Access:  Current line(s): PIV     Genitourinary:   Urinary Status: Voiding    Respiratory:   O2 Device: None (Room air)  Chronic home O2 use?: NO  Incentive spirometer at bedside: NO    GI:  Last BM (including prior to admit): 11/23/24  Current diet:  ADULT DIET; Regular  Passing flatus: YES    Pain Management:   Patient states pain is manageable on current regimen: YES    Skin:  Jorge Scale Score: 22  Interventions:      Patient Safety:  Fall Risk: Nursing Judgement-Fall Risk High(Add Comments): No  Fall Risk Interventions  Nursing Judgement-Fall Risk High(Add Comments): No  Toilet Every 2 Hours-In Advance of Need: No (Comment)  Hourly Visual Checks: In bed  Fall Visual Posted: Socks  Room Door Open: Deferred to promote rest  Alarm On: Other (Comment) (declined, up ad cely)  Patient Moved Closer to Nursing Station: No    Active Consults:   IP CONSULT TO GI    Length of Stay:  Expected LOS: 2  Actual LOS: 1    Goran Gibson LPN                           
Pt discharged to home with all belongings. AVS reviewed with pt and mother at bedside. IV removed  
2.2      Total Bilirubin 3.0 (H) 0.2 - 1.0 MG/DL    Bilirubin, Direct 0.3 (H) 0.0 - 0.2 MG/DL    Alk Phosphatase 33 (L) 45 - 117 U/L    AST 12 (L) 15 - 37 U/L    ALT 14 12 - 78 U/L   Basic Metabolic Panel    Collection Time: 11/23/24  4:11 AM   Result Value Ref Range    Sodium 138 136 - 145 mmol/L    Potassium 3.7 3.5 - 5.1 mmol/L    Chloride 105 97 - 108 mmol/L    CO2 28 21 - 32 mmol/L    Anion Gap 5 2 - 12 mmol/L    Glucose 105 (H) 65 - 100 mg/dL    BUN 8 6 - 20 MG/DL    Creatinine 0.70 0.55 - 1.02 MG/DL    BUN/Creatinine Ratio 11 (L) 12 - 20      Est, Glom Filt Rate >90 >60 ml/min/1.73m2    Calcium 8.9 8.5 - 10.1 MG/DL   CBC with Auto Differential    Collection Time: 11/23/24  4:11 AM   Result Value Ref Range    WBC 5.4 3.6 - 11.0 K/uL    RBC 4.59 3.80 - 5.20 M/uL    Hemoglobin 13.1 11.5 - 16.0 g/dL    Hematocrit 37.7 35.0 - 47.0 %    MCV 82.1 80.0 - 99.0 FL    MCH 28.5 26.0 - 34.0 PG    MCHC 34.7 30.0 - 36.5 g/dL    RDW 12.6 11.5 - 14.5 %    Platelets 218 150 - 400 K/uL    MPV 10.3 8.9 - 12.9 FL    Nucleated RBCs 0.0 0  WBC    nRBC 0.00 0.00 - 0.01 K/uL    Neutrophils % 62 32 - 75 %    Lymphocytes % 26 12 - 49 %    Monocytes % 7 5 - 13 %    Eosinophils % 4 0 - 7 %    Basophils % 1 0 - 1 %    Immature Granulocytes % 0 0.0 - 0.5 %    Neutrophils Absolute 3.3 1.8 - 8.0 K/UL    Lymphocytes Absolute 1.4 0.8 - 3.5 K/UL    Monocytes Absolute 0.4 0.0 - 1.0 K/UL    Eosinophils Absolute 0.2 0.0 - 0.4 K/UL    Basophils Absolute 0.1 0.0 - 0.1 K/UL    Immature Granulocytes Absolute 0.0 0.00 - 0.04 K/UL    Differential Type AUTOMATED         US ABDOMEN LIMITED Specify organ? GALLBLADDER, PANCREAS   Final Result   Cholelithiasis. No evidence of acute cholecystitis.         Electronically signed by ANATOLY Yost      MRI ABDOMEN W WO CONTRAST MRCP    (Results Pending)          Discussion/MDM:     [] High (any 2)    A. Problems (any 1)  [x] Acute/Chronic Illness/injury posing threat to life or bodily function:    [] Severe

## 2024-11-24 NOTE — DISCHARGE SUMMARY
Physician Discharge Summary     Patient ID:    Leslye Chicas  [id number]  39 y.o.  1985    Admit date: 11/22/2024    Discharge date : 11/24/2024      Final Diagnoses:   Hyperbilirubinemia [E80.6]  Biliary calculus of other site without obstruction [K80.80]  History of heroin use on Suboxone    Reason for Hospitalization:    Right upper quadrant pain      Hospital Course:   39-year-old with history of heroin use on Suboxone admitted last night with complaints of right upper quadrant abdominal pain. Patient noted with jaundice and reports of pale stool. Total bilirubin of 3.3 with a direct of 0.3. Right upper quadrant ultrasound showed cholelithiasis without evidence of acute cholecystitis.  MRI abdomen negative for choledocholithiasis.  No acute intra-abdominal process.  Total bilirubin has decreased from 3.3-1.7.  Right upper quadrant abdominal pain has significantly reduced.  Acute hepatitis panel negative.  Patient will be discharged home with outpatient follow-up.        Discharge Medications:      Medication List        START taking these medications      ciprofloxacin 500 MG tablet  Commonly known as: CIPRO  Take 1 tablet by mouth 2 times daily for 7 days            CONTINUE taking these medications      amphetamine-dextroamphetamine 5 MG extended release capsule  Commonly known as: ADDERALL XR     cloNIDine 0.1 MG tablet  Commonly known as: CATAPRES     ondansetron 4 MG tablet  Commonly known as: ZOFRAN  Take 1 tablet by mouth 3 times daily as needed for Nausea or Vomiting     Suboxone 8-2 MG Film SL film  Generic drug: buprenorphine-naloxone               Where to Get Your Medications        These medications were sent to [id number] - [place], [place] - [address] - P [phone] - F [phone] [address] [facility] [address]      Phone: [phone]   ciprofloxacin 500 MG tablet           Follow up Care:    Follow-up Information

## 2024-11-26 LAB
HBV CORE AB SERPL QL IA: NEGATIVE
HBV SURFACE AB SER QL: REACTIVE INDEX VALUE
HBV SURFACE AG SERPL QL IA: NEGATIVE
HCV AB SERPL QL IA: NORMAL
HCV IGG SERPL QL IA: NON REACTIVE S/CO RATIO
INTERPRETATION:: NORMAL
RFX TO HBC IGM: NORMAL

## 2025-01-09 ENCOUNTER — TRANSCRIBE ORDERS (OUTPATIENT)
Facility: HOSPITAL | Age: 40
End: 2025-01-09

## 2025-01-09 DIAGNOSIS — R17 TOTAL BILIRUBIN, ELEVATED: ICD-10-CM

## 2025-01-09 DIAGNOSIS — R10.13 DYSPEPSIA: ICD-10-CM

## 2025-01-09 DIAGNOSIS — R10.11 RIGHT UPPER QUADRANT PAIN: Primary | ICD-10-CM

## 2025-01-09 DIAGNOSIS — K80.20 GALL STONES: ICD-10-CM

## 2025-01-17 ENCOUNTER — HOSPITAL ENCOUNTER (OUTPATIENT)
Facility: HOSPITAL | Age: 40
Discharge: HOME | End: 2025-01-20
Payer: COMMERCIAL

## 2025-01-17 VITALS — BODY MASS INDEX: 26.61 KG/M2 | WEIGHT: 155 LBS

## 2025-01-17 DIAGNOSIS — K80.20 GALL STONES: ICD-10-CM

## 2025-01-17 DIAGNOSIS — R10.11 RIGHT UPPER QUADRANT PAIN: ICD-10-CM

## 2025-01-17 DIAGNOSIS — R10.13 DYSPEPSIA: ICD-10-CM

## 2025-01-17 DIAGNOSIS — R17 TOTAL BILIRUBIN, ELEVATED: ICD-10-CM

## 2025-01-17 PROCEDURE — A9537 TC99M MEBROFENIN: HCPCS

## 2025-01-17 PROCEDURE — 3430000000 HC RX DIAGNOSTIC RADIOPHARMACEUTICAL

## 2025-01-17 PROCEDURE — 78227 HEPATOBIL SYST IMAGE W/DRUG: CPT

## 2025-01-17 PROCEDURE — 6360000004 HC RX CONTRAST MEDICATION

## 2025-01-17 RX ORDER — KIT FOR THE PREPARATION OF TECHNETIUM TC 99M MEBROFENIN 45 MG/10ML
5.4 INJECTION, POWDER, LYOPHILIZED, FOR SOLUTION INTRAVENOUS
Status: COMPLETED | OUTPATIENT
Start: 2025-01-17 | End: 2025-01-17

## 2025-01-17 RX ORDER — SINCALIDE 5 UG/5ML
0.02 INJECTION, POWDER, LYOPHILIZED, FOR SOLUTION INTRAVENOUS ONCE
Status: COMPLETED | OUTPATIENT
Start: 2025-01-17 | End: 2025-01-17

## 2025-01-17 RX ADMIN — MEBROFENIN 5.4 MILLICURIE: 45 INJECTION, POWDER, LYOPHILIZED, FOR SOLUTION INTRAVENOUS at 13:00

## 2025-01-17 RX ADMIN — SINCALIDE 1.41 MCG: 5 INJECTION, POWDER, LYOPHILIZED, FOR SOLUTION INTRAVENOUS at 13:43

## 2025-01-20 ENCOUNTER — TELEPHONE (OUTPATIENT)
Age: 40
End: 2025-01-20

## 2025-01-20 NOTE — TELEPHONE ENCOUNTER
Lm to schedule consult from faxed referral    Added to wq    NP biliary dyskinesia, tari thomas, [facility], notes in folder

## 2025-02-03 ENCOUNTER — OFFICE VISIT (OUTPATIENT)
Age: 40
End: 2025-02-03
Payer: COMMERCIAL

## 2025-02-03 ENCOUNTER — PREP FOR PROCEDURE (OUTPATIENT)
Age: 40
End: 2025-02-03

## 2025-02-03 VITALS
HEIGHT: 64 IN | WEIGHT: 155 LBS | DIASTOLIC BLOOD PRESSURE: 77 MMHG | SYSTOLIC BLOOD PRESSURE: 120 MMHG | TEMPERATURE: 98.5 F | OXYGEN SATURATION: 99 % | BODY MASS INDEX: 26.46 KG/M2 | HEART RATE: 77 BPM | RESPIRATION RATE: 16 BRPM

## 2025-02-03 DIAGNOSIS — K80.20 GALLSTONES: ICD-10-CM

## 2025-02-03 DIAGNOSIS — K80.20 GALLSTONES: Primary | ICD-10-CM

## 2025-02-03 PROCEDURE — 99203 OFFICE O/P NEW LOW 30 MIN: CPT | Performed by: SURGERY

## 2025-02-03 RX ORDER — PANTOPRAZOLE SODIUM 40 MG/1
40 TABLET, DELAYED RELEASE ORAL DAILY PRN
COMMUNITY

## 2025-02-03 ASSESSMENT — PATIENT HEALTH QUESTIONNAIRE - PHQ9
1. LITTLE INTEREST OR PLEASURE IN DOING THINGS: NOT AT ALL
SUM OF ALL RESPONSES TO PHQ QUESTIONS 1-9: 0
SUM OF ALL RESPONSES TO PHQ QUESTIONS 1-9: 0
3. TROUBLE FALLING OR STAYING ASLEEP: NOT AT ALL
2. FEELING DOWN, DEPRESSED OR HOPELESS: NOT AT ALL
SUM OF ALL RESPONSES TO PHQ QUESTIONS 1-9: 0
SUM OF ALL RESPONSES TO PHQ QUESTIONS 1-9: 0
4. FEELING TIRED OR HAVING LITTLE ENERGY: NOT AT ALL
8. MOVING OR SPEAKING SO SLOWLY THAT OTHER PEOPLE COULD HAVE NOTICED. OR THE OPPOSITE, BEING SO FIGETY OR RESTLESS THAT YOU HAVE BEEN MOVING AROUND A LOT MORE THAN USUAL: NOT AT ALL
SUM OF ALL RESPONSES TO PHQ9 QUESTIONS 1 & 2: 0
6. FEELING BAD ABOUT YOURSELF - OR THAT YOU ARE A FAILURE OR HAVE LET YOURSELF OR YOUR FAMILY DOWN: NOT AT ALL
5. POOR APPETITE OR OVEREATING: NOT AT ALL
7. TROUBLE CONCENTRATING ON THINGS, SUCH AS READING THE NEWSPAPER OR WATCHING TELEVISION: NOT AT ALL
9. THOUGHTS THAT YOU WOULD BE BETTER OFF DEAD, OR OF HURTING YOURSELF: NOT AT ALL

## 2025-02-03 NOTE — PROGRESS NOTES
Identified pt with two pt identifiers (name and ). Reviewed chart in preparation for visit and have obtained necessary documentation.    Leslye Chicas is a 39 y.o. female Abdominal Pain (Seen at the request of Luiz Marlow PA-C for evaluation of biliary dyskinesia)  .    Vitals:    25 1331   BP: 120/77   Site: Left Upper Arm   Position: Sitting   Cuff Size: Small Adult   Pulse: 77   Resp: 16   Temp: 98.5 °F (36.9 °C)   TempSrc: Oral   SpO2: 99%   Weight: 70.3 kg (155 lb)   Height: 1.626 m (5' 4\")          1. Have you been to the ER, urgent care clinic since your last visit?  Hospitalized since your last visit?  no     2. Have you seen or consulted any other health care providers outside of the [facility] since your last visit?  Include any pap smears or colon screening.  no

## 2025-02-11 NOTE — H&P (VIEW-ONLY)
The patient (or guardian, if applicable) and other individuals in attendance with the patient were advised that Artificial Intelligence will be utilized during this visit to record and process the conversation to generate a clinical note. The patient (or guardian, if applicable) and other individuals in attendance at the appointment consented to the use of AI, including the recording. Other portions were at least partially completed with Dragon, the computer voice recognition software.  Quite often unanticipated grammatical, syntax, homophones, and other interpretive errors are inadvertently transcribed by the software.  Please disregard these errors.  Please excuse any errors that have escaped final proofreading.      Encounter Date: 2/3/2025  History and Physical    Referring provider:  Luiz Marlow PA-C   PCP:  No primary care provider on file.  From:  Patricio Newsome MD  Thank you.    Assessment & Plan  1. Cholelithiasis.  The patient has been experiencing persistent pain under her ribs and had jaundice, which led to hospitalization. Imaging studies, including an ultrasound and MRI, confirmed the presence of gallstones in the gallbladder without any stones in the common bile duct. A HIDA scan indicated decreased gallbladder function, suggesting chronic cholecystitis. The definitive treatment recommended is a laparoscopic cholecystectomy. The procedure, including the use of a cholangiogram to check for any remaining stones, was explained in detail. Postoperative pain management options were discussed, considering her current use of Suboxone. Alternatives such as prescription-strength Advil 800 mg or Tylenol were considered, with a preference for using Ultram if necessary. She was advised to avoid heavy lifting and strenuous exercise for 4 weeks post-surgery and to follow a diet avoiding greasy and fried foods for a month after the procedure. The patient will coordinate with the  to set a date for the

## 2025-02-13 RX ORDER — DEXTROAMPHETAMINE SACCHARATE, AMPHETAMINE ASPARTATE, DEXTROAMPHETAMINE SULFATE AND AMPHETAMINE SULFATE 7.5; 7.5; 7.5; 7.5 MG/1; MG/1; MG/1; MG/1
30 TABLET ORAL 2 TIMES DAILY
COMMUNITY

## 2025-02-13 NOTE — PROGRESS NOTES
[facility]  Preoperative Instructions        Surgery Date 2/19/2025          Time of Arrival to be called @ [phone]     On the day of your surgery, please report to Surgical Services Registration Desk and sign in at your designated time.  The Surgery Center is located to the right of the Emergency Room.     2. You must have someone with you to drive you home. You should not drive a car for 24 hours following surgery. Please make arrangements for a friend or family member to stay with you for the first 24 hours after your surgery.    3. Do not have anything to eat or drink (including water, gum, mints, coffee, juice) after midnight ??      .?This may not apply to medications prescribed by your physician. ?(Please note below the special instructions with medications to take the morning of your procedure.)    4. We recommend you do not drink any alcoholic beverages for 24 hours before and after your surgery.    5. Contact your surgeon's office for instructions on the following medications: non-steroidal anti-inflammatory drugs (i.e. Advil, Aleve), vitamins, and supplements. (Some surgeon's will want you to stop these medications prior to surgery and others may allow you to take them)  **If you are currently taking Plavix, Coumadin, Aspirin and/or other blood-thinning agents, contact your surgeon for instructions.** Your surgeon will partner with the physician prescribing these medications to determine if it is safe to stop or if you need to continue taking.  Please do not stop taking these medications without instructions from your surgeon    6. Wear comfortable clothes.  Wear glasses instead of contacts.  Do not bring any money or jewelry. Please bring picture ID, insurance card, and any prearranged co-payment or hospital payment.  Do not wear make-up, particularly mascara the morning of your surgery.  Do not wear nail polish, particularly if you are having foot /hand surgery.  Wear your

## 2025-02-19 ENCOUNTER — ANESTHESIA (OUTPATIENT)
Facility: HOSPITAL | Age: 40
End: 2025-02-19
Payer: COMMERCIAL

## 2025-02-19 ENCOUNTER — APPOINTMENT (OUTPATIENT)
Facility: HOSPITAL | Age: 40
End: 2025-02-19
Attending: SURGERY
Payer: COMMERCIAL

## 2025-02-19 ENCOUNTER — HOSPITAL ENCOUNTER (OUTPATIENT)
Facility: HOSPITAL | Age: 40
Setting detail: OUTPATIENT SURGERY
Discharge: HOME | End: 2025-02-19
Attending: SURGERY | Admitting: SURGERY
Payer: COMMERCIAL

## 2025-02-19 ENCOUNTER — ANESTHESIA EVENT (OUTPATIENT)
Facility: HOSPITAL | Age: 40
End: 2025-02-19
Payer: COMMERCIAL

## 2025-02-19 VITALS
RESPIRATION RATE: 14 BRPM | HEIGHT: 64 IN | DIASTOLIC BLOOD PRESSURE: 85 MMHG | SYSTOLIC BLOOD PRESSURE: 124 MMHG | HEART RATE: 72 BPM | TEMPERATURE: 98 F | OXYGEN SATURATION: 100 % | WEIGHT: 154.76 LBS | BODY MASS INDEX: 26.42 KG/M2

## 2025-02-19 DIAGNOSIS — K80.20 GALLSTONES: Primary | ICD-10-CM

## 2025-02-19 LAB — HCG UR QL: NEGATIVE

## 2025-02-19 PROCEDURE — C1729 CATH, DRAINAGE: HCPCS | Performed by: SURGERY

## 2025-02-19 PROCEDURE — 74300 X-RAY BILE DUCTS/PANCREAS: CPT | Performed by: SURGERY

## 2025-02-19 PROCEDURE — 47563 LAPARO CHOLECYSTECTOMY/GRAPH: CPT | Performed by: SURGERY

## 2025-02-19 PROCEDURE — 2709999900 HC NON-CHARGEABLE SUPPLY: Performed by: SURGERY

## 2025-02-19 PROCEDURE — 3700000000 HC ANESTHESIA ATTENDED CARE: Performed by: SURGERY

## 2025-02-19 PROCEDURE — 3700000001 HC ADD 15 MINUTES (ANESTHESIA): Performed by: SURGERY

## 2025-02-19 PROCEDURE — 2580000003 HC RX 258: Performed by: NURSE ANESTHETIST, CERTIFIED REGISTERED

## 2025-02-19 PROCEDURE — 3600000004 HC SURGERY LEVEL 4 BASE: Performed by: SURGERY

## 2025-02-19 PROCEDURE — 6360000002 HC RX W HCPCS: Performed by: SURGERY

## 2025-02-19 PROCEDURE — 7100000011 HC PHASE II RECOVERY - ADDTL 15 MIN: Performed by: SURGERY

## 2025-02-19 PROCEDURE — 76000 FLUOROSCOPY <1 HR PHYS/QHP: CPT

## 2025-02-19 PROCEDURE — 7100000010 HC PHASE II RECOVERY - FIRST 15 MIN: Performed by: SURGERY

## 2025-02-19 PROCEDURE — 7100000000 HC PACU RECOVERY - FIRST 15 MIN: Performed by: SURGERY

## 2025-02-19 PROCEDURE — 2500000003 HC RX 250 WO HCPCS: Performed by: SURGERY

## 2025-02-19 PROCEDURE — 7100000001 HC PACU RECOVERY - ADDTL 15 MIN: Performed by: SURGERY

## 2025-02-19 PROCEDURE — 74018 RADEX ABDOMEN 1 VIEW: CPT

## 2025-02-19 PROCEDURE — 2580000003 HC RX 258: Performed by: STUDENT IN AN ORGANIZED HEALTH CARE EDUCATION/TRAINING PROGRAM

## 2025-02-19 PROCEDURE — 6370000000 HC RX 637 (ALT 250 FOR IP): Performed by: SURGERY

## 2025-02-19 PROCEDURE — 81025 URINE PREGNANCY TEST: CPT

## 2025-02-19 PROCEDURE — 2500000003 HC RX 250 WO HCPCS: Performed by: NURSE ANESTHETIST, CERTIFIED REGISTERED

## 2025-02-19 PROCEDURE — 6360000002 HC RX W HCPCS: Performed by: NURSE ANESTHETIST, CERTIFIED REGISTERED

## 2025-02-19 PROCEDURE — 88304 TISSUE EXAM BY PATHOLOGIST: CPT

## 2025-02-19 PROCEDURE — 6360000002 HC RX W HCPCS: Performed by: STUDENT IN AN ORGANIZED HEALTH CARE EDUCATION/TRAINING PROGRAM

## 2025-02-19 PROCEDURE — 3600000014 HC SURGERY LEVEL 4 ADDTL 15MIN: Performed by: SURGERY

## 2025-02-19 PROCEDURE — 6360000004 HC RX CONTRAST MEDICATION: Performed by: SURGERY

## 2025-02-19 RX ORDER — LIDOCAINE HYDROCHLORIDE 20 MG/ML
INJECTION, SOLUTION EPIDURAL; INFILTRATION; INTRACAUDAL; PERINEURAL
Status: DISCONTINUED | OUTPATIENT
Start: 2025-02-19 | End: 2025-02-19 | Stop reason: SDUPTHER

## 2025-02-19 RX ORDER — DEXAMETHASONE SODIUM PHOSPHATE 4 MG/ML
INJECTION, SOLUTION INTRA-ARTICULAR; INTRALESIONAL; INTRAMUSCULAR; INTRAVENOUS; SOFT TISSUE
Status: DISCONTINUED | OUTPATIENT
Start: 2025-02-19 | End: 2025-02-19 | Stop reason: SDUPTHER

## 2025-02-19 RX ORDER — DEXTROSE MONOHYDRATE 100 MG/ML
INJECTION, SOLUTION INTRAVENOUS CONTINUOUS PRN
Status: DISCONTINUED | OUTPATIENT
Start: 2025-02-19 | End: 2025-02-19 | Stop reason: HOSPADM

## 2025-02-19 RX ORDER — SODIUM CHLORIDE 0.9 % (FLUSH) 0.9 %
5-40 SYRINGE (ML) INJECTION PRN
Status: DISCONTINUED | OUTPATIENT
Start: 2025-02-19 | End: 2025-02-19 | Stop reason: HOSPADM

## 2025-02-19 RX ORDER — GLYCOPYRROLATE 0.2 MG/ML
INJECTION INTRAMUSCULAR; INTRAVENOUS
Status: DISCONTINUED | OUTPATIENT
Start: 2025-02-19 | End: 2025-02-19 | Stop reason: SDUPTHER

## 2025-02-19 RX ORDER — NALOXONE HYDROCHLORIDE 0.4 MG/ML
INJECTION, SOLUTION INTRAMUSCULAR; INTRAVENOUS; SUBCUTANEOUS PRN
Status: DISCONTINUED | OUTPATIENT
Start: 2025-02-19 | End: 2025-02-19 | Stop reason: HOSPADM

## 2025-02-19 RX ORDER — ROCURONIUM BROMIDE 10 MG/ML
INJECTION, SOLUTION INTRAVENOUS
Status: DISCONTINUED | OUTPATIENT
Start: 2025-02-19 | End: 2025-02-19 | Stop reason: SDUPTHER

## 2025-02-19 RX ORDER — GLUCAGON 1 MG/ML
1 KIT INJECTION PRN
Status: DISCONTINUED | OUTPATIENT
Start: 2025-02-19 | End: 2025-02-19 | Stop reason: HOSPADM

## 2025-02-19 RX ORDER — SODIUM CHLORIDE 0.9 % (FLUSH) 0.9 %
5-40 SYRINGE (ML) INJECTION EVERY 12 HOURS SCHEDULED
Status: DISCONTINUED | OUTPATIENT
Start: 2025-02-19 | End: 2025-02-19 | Stop reason: HOSPADM

## 2025-02-19 RX ORDER — ONDANSETRON 2 MG/ML
INJECTION INTRAMUSCULAR; INTRAVENOUS
Status: DISCONTINUED | OUTPATIENT
Start: 2025-02-19 | End: 2025-02-19 | Stop reason: SDUPTHER

## 2025-02-19 RX ORDER — HYDROMORPHONE HYDROCHLORIDE 1 MG/ML
0.5 INJECTION, SOLUTION INTRAMUSCULAR; INTRAVENOUS; SUBCUTANEOUS EVERY 5 MIN PRN
Status: COMPLETED | OUTPATIENT
Start: 2025-02-19 | End: 2025-02-19

## 2025-02-19 RX ORDER — PROPOFOL 10 MG/ML
INJECTION, EMULSION INTRAVENOUS
Status: DISCONTINUED | OUTPATIENT
Start: 2025-02-19 | End: 2025-02-19 | Stop reason: SDUPTHER

## 2025-02-19 RX ORDER — POLYETHYLENE GLYCOL 3350 17 G/17G
17 POWDER, FOR SOLUTION ORAL DAILY
Qty: 116 G | Refills: 0 | Status: SHIPPED | OUTPATIENT
Start: 2025-02-19 | End: 2025-02-26

## 2025-02-19 RX ORDER — IBUPROFEN 800 MG/1
800 TABLET, FILM COATED ORAL 3 TIMES DAILY PRN
Qty: 20 TABLET | Refills: 0 | Status: SHIPPED | OUTPATIENT
Start: 2025-02-19

## 2025-02-19 RX ORDER — PHENYLEPHRINE HCL IN 0.9% NACL 0.4MG/10ML
SYRINGE (ML) INTRAVENOUS
Status: DISCONTINUED | OUTPATIENT
Start: 2025-02-19 | End: 2025-02-19 | Stop reason: SDUPTHER

## 2025-02-19 RX ORDER — SODIUM CHLORIDE, SODIUM LACTATE, POTASSIUM CHLORIDE, CALCIUM CHLORIDE 600; 310; 30; 20 MG/100ML; MG/100ML; MG/100ML; MG/100ML
INJECTION, SOLUTION INTRAVENOUS CONTINUOUS
Status: DISCONTINUED | OUTPATIENT
Start: 2025-02-19 | End: 2025-02-19 | Stop reason: HOSPADM

## 2025-02-19 RX ORDER — OXYCODONE HYDROCHLORIDE 5 MG/1
5 TABLET ORAL EVERY 6 HOURS PRN
Qty: 20 TABLET | Refills: 0 | Status: SHIPPED | OUTPATIENT
Start: 2025-02-19 | End: 2025-02-24

## 2025-02-19 RX ORDER — OXYCODONE HYDROCHLORIDE 5 MG/1
10 TABLET ORAL ONCE
Status: COMPLETED | OUTPATIENT
Start: 2025-02-19 | End: 2025-02-19

## 2025-02-19 RX ORDER — ONDANSETRON 2 MG/ML
4 INJECTION INTRAMUSCULAR; INTRAVENOUS
Status: COMPLETED | OUTPATIENT
Start: 2025-02-19 | End: 2025-02-19

## 2025-02-19 RX ORDER — PROCHLORPERAZINE EDISYLATE 5 MG/ML
5 INJECTION INTRAMUSCULAR; INTRAVENOUS
Status: COMPLETED | OUTPATIENT
Start: 2025-02-19 | End: 2025-02-19

## 2025-02-19 RX ORDER — IPRATROPIUM BROMIDE AND ALBUTEROL SULFATE 2.5; .5 MG/3ML; MG/3ML
1 SOLUTION RESPIRATORY (INHALATION)
Status: DISCONTINUED | OUTPATIENT
Start: 2025-02-19 | End: 2025-02-19 | Stop reason: HOSPADM

## 2025-02-19 RX ORDER — KETOROLAC TROMETHAMINE 30 MG/ML
INJECTION, SOLUTION INTRAMUSCULAR; INTRAVENOUS
Status: DISCONTINUED | OUTPATIENT
Start: 2025-02-19 | End: 2025-02-19 | Stop reason: SDUPTHER

## 2025-02-19 RX ORDER — IOPAMIDOL 755 MG/ML
INJECTION, SOLUTION INTRAVASCULAR PRN
Status: DISCONTINUED | OUTPATIENT
Start: 2025-02-19 | End: 2025-02-19 | Stop reason: ALTCHOICE

## 2025-02-19 RX ORDER — SODIUM CHLORIDE 9 MG/ML
INJECTION, SOLUTION INTRAVENOUS PRN
Status: DISCONTINUED | OUTPATIENT
Start: 2025-02-19 | End: 2025-02-19 | Stop reason: HOSPADM

## 2025-02-19 RX ORDER — BUPIVACAINE HYDROCHLORIDE 5 MG/ML
INJECTION, SOLUTION PERINEURAL PRN
Status: DISCONTINUED | OUTPATIENT
Start: 2025-02-19 | End: 2025-02-19 | Stop reason: ALTCHOICE

## 2025-02-19 RX ORDER — SODIUM CHLORIDE, SODIUM LACTATE, POTASSIUM CHLORIDE, CALCIUM CHLORIDE 600; 310; 30; 20 MG/100ML; MG/100ML; MG/100ML; MG/100ML
INJECTION, SOLUTION INTRAVENOUS
Status: DISCONTINUED | OUTPATIENT
Start: 2025-02-19 | End: 2025-02-19 | Stop reason: SDUPTHER

## 2025-02-19 RX ORDER — FENTANYL CITRATE 50 UG/ML
25 INJECTION, SOLUTION INTRAMUSCULAR; INTRAVENOUS EVERY 5 MIN PRN
Status: COMPLETED | OUTPATIENT
Start: 2025-02-19 | End: 2025-02-19

## 2025-02-19 RX ORDER — MIDAZOLAM HYDROCHLORIDE 1 MG/ML
INJECTION, SOLUTION INTRAMUSCULAR; INTRAVENOUS
Status: DISCONTINUED | OUTPATIENT
Start: 2025-02-19 | End: 2025-02-19 | Stop reason: SDUPTHER

## 2025-02-19 RX ORDER — NEOSTIGMINE METHYLSULFATE 1 MG/ML
INJECTION, SOLUTION INTRAVENOUS
Status: DISCONTINUED | OUTPATIENT
Start: 2025-02-19 | End: 2025-02-19 | Stop reason: SDUPTHER

## 2025-02-19 RX ORDER — 0.9 % SODIUM CHLORIDE 0.9 %
INTRAVENOUS SOLUTION INTRAVENOUS
Status: DISCONTINUED | OUTPATIENT
Start: 2025-02-19 | End: 2025-02-19 | Stop reason: SDUPTHER

## 2025-02-19 RX ADMIN — FENTANYL CITRATE 25 MCG: 50 INJECTION INTRAMUSCULAR; INTRAVENOUS at 10:05

## 2025-02-19 RX ADMIN — Medication 3 MG: at 09:26

## 2025-02-19 RX ADMIN — Medication 80 MCG: at 08:48

## 2025-02-19 RX ADMIN — HYDROMORPHONE HYDROCHLORIDE 1 MG: 1 INJECTION, SOLUTION INTRAMUSCULAR; INTRAVENOUS; SUBCUTANEOUS at 08:55

## 2025-02-19 RX ADMIN — OXYCODONE 10 MG: 5 TABLET ORAL at 10:20

## 2025-02-19 RX ADMIN — SODIUM CHLORIDE, POTASSIUM CHLORIDE, SODIUM LACTATE AND CALCIUM CHLORIDE: 600; 310; 30; 20 INJECTION, SOLUTION INTRAVENOUS at 06:55

## 2025-02-19 RX ADMIN — HYDROMORPHONE HYDROCHLORIDE 0.5 MG: 1 INJECTION, SOLUTION INTRAMUSCULAR; INTRAVENOUS; SUBCUTANEOUS at 09:55

## 2025-02-19 RX ADMIN — PROPOFOL 200 MG: 10 INJECTION, EMULSION INTRAVENOUS at 08:35

## 2025-02-19 RX ADMIN — FENTANYL CITRATE 25 MCG: 50 INJECTION INTRAMUSCULAR; INTRAVENOUS at 09:50

## 2025-02-19 RX ADMIN — MIDAZOLAM HYDROCHLORIDE 2 MG: 1 INJECTION, SOLUTION INTRAMUSCULAR; INTRAVENOUS at 08:33

## 2025-02-19 RX ADMIN — Medication 50 MG: at 08:35

## 2025-02-19 RX ADMIN — FENTANYL CITRATE 25 MCG: 50 INJECTION INTRAMUSCULAR; INTRAVENOUS at 10:00

## 2025-02-19 RX ADMIN — SODIUM CHLORIDE, POTASSIUM CHLORIDE, SODIUM LACTATE AND CALCIUM CHLORIDE: 600; 310; 30; 20 INJECTION, SOLUTION INTRAVENOUS at 08:33

## 2025-02-19 RX ADMIN — LIDOCAINE HYDROCHLORIDE 100 MG: 20 INJECTION, SOLUTION EPIDURAL; INFILTRATION; INTRACAUDAL; PERINEURAL at 08:35

## 2025-02-19 RX ADMIN — FENTANYL CITRATE 25 MCG: 50 INJECTION INTRAMUSCULAR; INTRAVENOUS at 09:55

## 2025-02-19 RX ADMIN — HYDROMORPHONE HYDROCHLORIDE 1 MG: 1 INJECTION, SOLUTION INTRAMUSCULAR; INTRAVENOUS; SUBCUTANEOUS at 09:26

## 2025-02-19 RX ADMIN — DEXAMETHASONE SODIUM PHOSPHATE 8 MG: 4 INJECTION INTRA-ARTICULAR; INTRALESIONAL; INTRAMUSCULAR; INTRAVENOUS; SOFT TISSUE at 08:50

## 2025-02-19 RX ADMIN — ONDANSETRON 4 MG: 2 INJECTION INTRAMUSCULAR; INTRAVENOUS at 10:20

## 2025-02-19 RX ADMIN — Medication 120 MCG: at 08:38

## 2025-02-19 RX ADMIN — Medication 80 MCG: at 09:00

## 2025-02-19 RX ADMIN — SODIUM CHLORIDE 100 ML: 9 INJECTION, SOLUTION INTRAVENOUS at 09:40

## 2025-02-19 RX ADMIN — ROCURONIUM BROMIDE 35 MG: 10 INJECTION INTRAVENOUS at 08:35

## 2025-02-19 RX ADMIN — ONDANSETRON 4 MG: 2 INJECTION INTRAMUSCULAR; INTRAVENOUS at 08:50

## 2025-02-19 RX ADMIN — HYDROMORPHONE HYDROCHLORIDE 0.5 MG: 1 INJECTION, SOLUTION INTRAMUSCULAR; INTRAVENOUS; SUBCUTANEOUS at 10:15

## 2025-02-19 RX ADMIN — KETOROLAC TROMETHAMINE 15 MG: 30 INJECTION, SOLUTION INTRAMUSCULAR at 09:30

## 2025-02-19 RX ADMIN — GLYCOPYRROLATE 0.35 MG: 0.2 INJECTION INTRAMUSCULAR; INTRAVENOUS at 09:26

## 2025-02-19 RX ADMIN — PROCHLORPERAZINE EDISYLATE 5 MG: 5 INJECTION INTRAMUSCULAR; INTRAVENOUS at 09:50

## 2025-02-19 RX ADMIN — WATER 2000 MG: 1 INJECTION INTRAMUSCULAR; INTRAVENOUS; SUBCUTANEOUS at 08:45

## 2025-02-19 ASSESSMENT — PAIN DESCRIPTION - DESCRIPTORS
DESCRIPTORS: ACHING
DESCRIPTORS: SORE;ACHING
DESCRIPTORS: ACHING

## 2025-02-19 ASSESSMENT — PAIN DESCRIPTION - ORIENTATION
ORIENTATION: ANTERIOR

## 2025-02-19 ASSESSMENT — PAIN SCALES - GENERAL
PAINLEVEL_OUTOF10: 6
PAINLEVEL_OUTOF10: 8
PAINLEVEL_OUTOF10: 0
PAINLEVEL_OUTOF10: 6
PAINLEVEL_OUTOF10: 8
PAINLEVEL_OUTOF10: 5

## 2025-02-19 ASSESSMENT — PAIN DESCRIPTION - LOCATION
LOCATION: ABDOMEN

## 2025-02-19 NOTE — OP NOTE
DATE OF PROCEDURE:  2/19/2025     PREOPERATIVE DIAGNOSIS:   Gallstones with biliary dyskinesia    POSTOPERATIVE DIAGNOSIS:   Same    OPERATIVE PROCEDURE:  Laparoscopic cholecystectomy with cholangiogram, interpretation of cholangiogram, images saved in PACS, radiologist was not present (CPT [id number])    SURGEON:  Patricio Newsome MD    ANESTHESIA:  General endotracheal.    EBL: minimal    SPECIMENS:   ID Type Source Tests Collected by Time Destination   1 : gallbladder Tissue Gallbladder SURGICAL PATHOLOGY Patricio Newsome MD 2/19/2025 0913         FINDINGS:  Grossly unremarkable gallbladder, gallstones.  No CBD filling defect on cholangiogram.    INDICATIONS:  RUQ and epigastric pain.  Gallstones on ultrasound and abnormal gallbladder EF on HIDA.    DESCRIPTION OF PROCEDURE:  After obtaining informed consent, the patient was taken to the operating room and placed supine on the operating table.  An operative time-out was performed, and general endotracheal anesthesia was induced.  Preoperative antibiotics were administered, and the abdomen was prepped and draped in the usual sterile fashion.  The abdomen was then entered just above the umbilicus using a 5-mm optical trocar.  The abdomen was the insufflated without incident and was visually explored.  There was no other visible pathology noted.  Two right upper quadrant 5-mm ports were placed under direct vision, followed by a 12-mm port in the subxiphoid position.  Local anesthetic was infiltrated at the port sites prior to placement.    The gallbladder fundus was then grasped and retracted cephalad over the liver.  The triangle of Calot was exposed, and the cystic duct and artery were skeletonized using a combination of blunt dissection with a Maryland dissector and hook electrocautery.  The cystic artery was then divided between clips with 2 clips left on the patient side.  The cystic duct was then traced from the gallbladder infundibulum into its

## 2025-02-19 NOTE — PROGRESS NOTES
Spiritual Health History and Assessment/Progress Note  [facility]    Pre-Op,  ,  ,      Name: Leslye Chicas MRN: [id number]    Age: 39 y.o.     Sex: female   Language: English   Temple: Other   Gallstones     Date: 2/19/2025            Total Time Calculated: 8 min              Spiritual Assessment began in [facility]        Referral/Consult From: Rounding   Encounter Overview/Reason: Pre-Op  Service Provided For: Patient    Gabriela, Belief, Meaning:   Patient identifies as spiritual and has beliefs or practices that help with coping during difficult times  Family/Friends No family/friends present      Importance and Influence:  Patient unable to assess at this time  Family/Friends No family/friends present    Community:  Patient feels well-supported. Support system includes: Spouse/Partner and Children  Family/Friends No family/friends present    Assessment and Plan of Care:     Patient Interventions include: Facilitated expression of thoughts and feelings and Affirmed coping skills/support systems  Family/Friends Interventions include: No family/friends present    Patient Plan of Care: Spiritual Care available upon further referral  Family/Friends Plan of Care: Spiritual Care available upon further referral    Electronically signed by Chaplain Megan on 2/19/2025 at 9:50 AM

## 2025-02-19 NOTE — INTERVAL H&P NOTE
Update History & Physical    The Patient's History and Physical below was reviewed with the patient and I examined the patient today.  There was no change.  The surgical site was confirmed by the patient and me.    Plan:  The risk, benefits, expected outcome, and alternative to the recommended procedure have been discussed with the patient.  Patient understands and wants to proceed with the procedure.

## 2025-02-19 NOTE — FLOWSHEET NOTE
02/19/25 1150   AVS Reviewed   AVS & discharge instructions reviewed with patient and/or representative? Yes   Reviewed instructions with Patient;Other (name and relationship in comment)  (spouse)   Level of Understanding Questions answered;Verbalized understanding;Teach back completed

## 2025-02-19 NOTE — ANESTHESIA PRE PROCEDURE
AST 7 11/24/2024 03:34 AM    ALT 10 11/24/2024 03:34 AM       POC Tests: No results for input(s): \"POCGLU\", \"POCNA\", \"POCK\", \"POCCL\", \"POCBUN\", \"POCHEMO\", \"POCHCT\" in the last 72 hours.    Coags:   Lab Results   Component Value Date/Time    PROTIME 12.3 11/24/2024 03:34 AM    INR 1.2 11/24/2024 03:34 AM       HCG (If Applicable):   Lab Results   Component Value Date    PREGTESTUR Negative 02/19/2025        ABGs: No results found for: \"PHART\", \"PO2ART\", \"GRE1DDA\", \"GVZ3PRY\", \"BEART\", \"S0ZOHINH\"     Type & Screen (If Applicable):  No results found for: \"ABORH\", \"LABANTI\"    Drug/Infectious Status (If Applicable):  Lab Results   Component Value Date/Time    HEPCAB 0.20 11/23/2024 10:29 AM       COVID-19 Screening (If Applicable): No results found for: \"COVID19\"        Anesthesia Evaluation  Patient summary reviewed and Nursing notes reviewed   no history of anesthetic complications:   Airway: Mallampati: II  TM distance: >3 FB   Neck ROM: full  Mouth opening: > = 3 FB   Dental: normal exam         Pulmonary:normal exam    (+)           asthma:                            Cardiovascular:  Exercise tolerance: good (>4 METS)                    Neuro/Psych:   (+) psychiatric history: stable with treatmentdepression/anxiety             GI/Hepatic/Renal:            ROS comment: hyperbilirubinemia.   Endo/Other: Negative Endo/Other ROS                    Abdominal:             Vascular: negative vascular ROS.         Other Findings: Abdominal exam deferred            Anesthesia Plan      general     ASA 2       Induction: intravenous.    MIPS: Postoperative opioids intended and Prophylactic antiemetics administered.  Anesthetic plan and risks discussed with patient.    Use of blood products discussed with patient whom consented to blood products.    Plan discussed with CRNA.    Attending anesthesiologist reviewed and agrees with Preprocedure content                Maxi Cornejo MD   2/19/2025

## 2025-02-19 NOTE — ANESTHESIA POSTPROCEDURE EVALUATION
Department of Anesthesiology  Postprocedure Note    Patient: Leslye Chicas  MRN: [id number]  YOB: 1985  Date of evaluation: 2/19/2025    Procedure Summary       Date: 02/19/25 Room / Location: [facility] MAIN OR  / [facility] MAIN OR    Anesthesia Start: 0833 Anesthesia Stop: 0949    Procedure: LAPAROSCOPIC CHOLECYSTECTOMY WITH CHOLANGIOGRAM (Abdomen) Diagnosis:       Gallstones      (Gallstones [K80.20])    Providers: Patricio Newsome MD Responsible Provider: Maxi Cornejo MD    Anesthesia Type: General ASA Status: 2            Anesthesia Type: General    Mike Phase I: Mike Score: 9    Mike Phase II:      Anesthesia Post Evaluation    Patient location during evaluation: PACU  Patient participation: complete - patient participated  Level of consciousness: awake and alert  Airway patency: patent  Nausea & Vomiting: no nausea  Cardiovascular status: hemodynamically stable  Respiratory status: acceptable  Hydration status: euvolemic  Multimodal analgesia pain management approach        No notable events documented.

## 2025-02-19 NOTE — DISCHARGE INSTRUCTIONS
Discharge Instructions:  Laparoscopic Cholecystectomy (Gallbladder Removal)  Dr. Newsome    Call on next business day to arrange appointment for follow up in 3 weeks -- [phone].    Activity:  Walk regularly - can walk today as tolerated, but make yourself walk at least 50 yards at least 6 times per day starting tomorrow.  No lifting more than 10 -15 pounds for 4 weeks.     You may resume driving in 5-7 days unless still requiring narcotics for pain.      Work:  You may return to work in 1 or 2 weeks to light activity. No lifting more than 10 pounds (=\"light duty\") for four weeks.    If your employer can not accommodate \"light duty,\" your employer will need to provide any necessary paperwork to our office.   This document with serve as the initial \"note\" to your employer.    Diet:  You may resume normal diet after 24 hours.  Fatty foods may still cause some stomach upset.  Avoid fatty/greasy foods for 1 month, then add back slowly.    Wound Care:  Dermabond glue dressing will fall off over the next couple of weeks.  It is waterproof.  You may shower, but no swimming or baths for 2 weeks.  Your incisions may ooze for a few days.  Do not worry about this.  If you experience a lot of drainage, develop redness around the wound, or a fever over 101 F occurs please call the office.      Medications:  See attached \"Medication Reconciliation.\"    Resume home medications as indicated on the Medical Reconciliation form.  Do not use blood thinners (such as Aspirin, Coumadin, or Plavix) until 3 days after surgery.      Pain medications:  Non steroidal antiinflammatories (ibuprofen -- Advil or Motrin) seem to work best for post surgical pain.  Try these first.  A narcotic prescription will also be given for breakthrough pain.   Do not use Tylenol while taking the narcotic because there is Tylenol in the narcotic pill, and too much Tylenol can injure your liver.  Tylenol can be used in place of the narcotic, but do not take

## 2025-03-11 ENCOUNTER — OFFICE VISIT (OUTPATIENT)
Age: 40
End: 2025-03-11

## 2025-03-11 VITALS
HEIGHT: 64 IN | SYSTOLIC BLOOD PRESSURE: 122 MMHG | HEART RATE: 82 BPM | WEIGHT: 146 LBS | DIASTOLIC BLOOD PRESSURE: 80 MMHG | BODY MASS INDEX: 24.92 KG/M2 | OXYGEN SATURATION: 97 % | TEMPERATURE: 97.6 F

## 2025-03-11 DIAGNOSIS — Z48.89 POSTOPERATIVE VISIT: Primary | ICD-10-CM

## 2025-03-11 PROCEDURE — 99024 POSTOP FOLLOW-UP VISIT: CPT | Performed by: SURGERY

## 2025-03-11 ASSESSMENT — PATIENT HEALTH QUESTIONNAIRE - PHQ9
SUM OF ALL RESPONSES TO PHQ QUESTIONS 1-9: 2
1. LITTLE INTEREST OR PLEASURE IN DOING THINGS: SEVERAL DAYS
2. FEELING DOWN, DEPRESSED OR HOPELESS: SEVERAL DAYS
SUM OF ALL RESPONSES TO PHQ QUESTIONS 1-9: 2

## 2025-03-11 NOTE — PROGRESS NOTES
Identified pt with two pt identifiers (name and ). Reviewed chart in preparation for visit and have obtained necessary documentation.    Leslye Chicas is a 39 y.o. female  Chief Complaint   Patient presents with    Post-Op Check     POST OP - 3 WK PO LAP TANISAH       /80 (BP Site: Right Upper Arm, Patient Position: Sitting, BP Cuff Size: Large Adult)   Pulse 82   Temp 97.6 °F (36.4 °C) (Temporal)   Ht 1.626 m (5' 4.02\")   Wt 66.2 kg (146 lb)   SpO2 97%   BMI 25.05 kg/m²     1. Have you been to the ER, urgent care clinic since your last visit?  Hospitalized since your last visit?no    2. Have you seen or consulted any other health care providers outside of the [facility] since your last visit?  Include any pap smears or colon screening. no

## 2025-03-11 NOTE — PROGRESS NOTES
To: Luiz Marlow PA-C  From: Patricio Newsome MD  Thank you.    Encounter Date: 3/11/2025    Leslye Chicas is a 39 y.o. female     History of Present Illness  The patient is a 39-year-old female who presents for follow-up status post laparoscopic cholecystectomy for gallstones on 02/19/2025.    She reports satisfactory healing progress, with no current pain complaints. However, she does experience some soreness, which she attributes to overexertion. She recalls an incident where she jogged a few steps during her daughter's practice, resulting in discomfort. Her appetite remains normal, and she reports regular bowel movements. She has reviewed her pathology report via [employer] and expresses curiosity about the number of gallstones identified.    Vitals:    03/11/25 1349   BP: 122/80   Pulse: 82   Temp: 97.6 °F (36.4 °C)   SpO2: 97%     Physical Exam  Abdominal exam was performed.  Incisions are well-healed.  Abdomen is benign.    Assessment & Plan  1. Postoperative status following laparoscopic cholecystectomy.  Her surgical incisions are healing appropriately, with no signs of complications. The pathology report confirmed cholelithiasis, consistent with the presence of gallstones. She was advised to apply skin lotion to the incision sites, including the umbilical region, to facilitate the peeling process. She was also instructed to gently clean the incisions with soap during her showers. It is anticipated that the peeling process will commence within 3 to 4 days.    PROCEDURE  The patient underwent a laparoscopic cholecystectomy for gallstones on 02/19/2025.    Please note that this dictation was at least partially completed with Dragon, the computer voice recognition software.  Quite often unanticipated grammatical, syntax, homophones, and other interpretive errors are inadvertently transcribed by the software.  Please disregard these errors.  Please excuse any errors that have escaped final proofreading.  The

## (undated) DEVICE — KIT CHOLGM POLYUR W/ KARLAN BLLN CATH 4FR L60CM 5MM INTRO

## (undated) DEVICE — SOL IRR SOD CHL 0.9% TITAN XL CNTNR 3000ML

## (undated) DEVICE — DRAPE C ARM W/ POLY STRP W42XL72IN FOR MOB XR

## (undated) DEVICE — Device

## (undated) DEVICE — ELECTRODE ES 36CM LAP FLAT L HK COAT DISP CLEANCOAT

## (undated) DEVICE — SUTURE VICRYL + SZ 0 L27IN ABSRB VLT L26MM UR-6 5/8 CIR VCP603H

## (undated) DEVICE — GENERAL LAPAROSCOPY-MRMC: Brand: MEDLINE INDUSTRIES, INC.

## (undated) DEVICE — TISSUE RETRIEVAL SYSTEM: Brand: INZII RETRIEVAL SYSTEM

## (undated) DEVICE — SET EXT W/2 NEEDLELESS Y-SITES 4-WAY STOPCOCK

## (undated) DEVICE — GLOVE SURG SZ 85 CRM LTX FREE POLYISOPRENE POLYMER BEAD ANTI

## (undated) DEVICE — SOLUTION IV 500 ML 0.9 NACL INJ EXCEL CONTAINER USP LF

## (undated) DEVICE — LIQUIBAND RAPID ADHESIVE 36/CS 0.8ML: Brand: MEDLINE

## (undated) DEVICE — CLICKLINE SCISSORS INSERT: Brand: CLICKLINE

## (undated) DEVICE — LAPAROSCOPIC TROCAR SLEEVE/SINGLE USE: Brand: KII® OPTICAL ACCESS SYSTEM

## (undated) DEVICE — SET TUBE INSUFFLATION HI FLOW PNEUMOCLEAR

## (undated) DEVICE — SYSTEM EVAC SMOKE LAPARSCOPIC

## (undated) DEVICE — APPLIER CLP M L L11.4IN DIA10MM ENDOSCP ROT MULT FOR LIG

## (undated) DEVICE — SYRINGE 20ML LL S/C 50

## (undated) DEVICE — SYRINGE ANGIO CNTRST DEL 20 CC POLYCARB DK GRN MEDALLION

## (undated) DEVICE — GLOVE SURG SZ 8 CRM LTX FREE POLYISOPRENE POLYMER BEAD ANTI

## (undated) DEVICE — BLADE CLIPPER GEN PURP NS

## (undated) DEVICE — TROCAR: Brand: KII® OPTICAL ACCESS SYSTEM

## (undated) DEVICE — SUTURE MONOCRYL SZ 4-0 L27IN ABSRB UD L19MM PS-2 1/2 CIR PRIM Y426H

## (undated) DEVICE — GOWN,SIRUS,NONRNF,SETINSLV,2XL,18/CS: Brand: MEDLINE

## (undated) DEVICE — TROCAR: Brand: KII® SLEEVE

## (undated) DEVICE — DECANTER BAG 9": Brand: MEDLINE INDUSTRIES, INC.

## (undated) DEVICE — KIT,1200CC CANISTER,3/16"X6' TUBING: Brand: MEDLINE INDUSTRIES, INC.

## (undated) DEVICE — COVER,MAYO STAND,XL,STERILE: Brand: MEDLINE

## (undated) DEVICE — HYPODERMIC SAFETY NEEDLE: Brand: MAGELLAN